# Patient Record
Sex: FEMALE | Race: WHITE | NOT HISPANIC OR LATINO | Employment: OTHER | RURAL
[De-identification: names, ages, dates, MRNs, and addresses within clinical notes are randomized per-mention and may not be internally consistent; named-entity substitution may affect disease eponyms.]

---

## 2019-07-05 ENCOUNTER — TELEPHONE (OUTPATIENT)
Dept: FAMILY MEDICINE CLINIC | Facility: CLINIC | Age: 79
End: 2019-07-05

## 2019-07-05 DIAGNOSIS — L71.9 ROSACEA: ICD-10-CM

## 2019-07-05 DIAGNOSIS — F41.9 ANXIETY: Primary | ICD-10-CM

## 2019-07-05 PROBLEM — I10 HYPERTENSION: Status: ACTIVE | Noted: 2019-07-05

## 2019-07-05 PROBLEM — M19.90 OSTEOARTHRITIS: Status: ACTIVE | Noted: 2019-07-05

## 2019-07-05 RX ORDER — ALPRAZOLAM 1 MG/1
0.5 TABLET ORAL DAILY PRN
Qty: 30 TABLET | Refills: 2 | Status: SHIPPED | OUTPATIENT
Start: 2019-07-05 | End: 2020-07-09 | Stop reason: SDUPTHER

## 2019-07-05 RX ORDER — LISINOPRIL 10 MG/1
TABLET ORAL
COMMUNITY
Start: 2013-09-05 | End: 2019-12-27

## 2019-07-05 RX ORDER — HYDROCHLOROTHIAZIDE 25 MG/1
TABLET ORAL
COMMUNITY
Start: 2012-05-15 | End: 2019-12-23

## 2019-07-05 RX ORDER — METRONIDAZOLE 7.5 MG/G
GEL TOPICAL DAILY
Qty: 45 G | Refills: 5 | Status: SHIPPED | OUTPATIENT
Start: 2019-07-05

## 2019-12-27 RX ORDER — HYDROCHLOROTHIAZIDE 25 MG/1
TABLET ORAL
Qty: 90 TABLET | Refills: 1 | Status: SHIPPED | OUTPATIENT
Start: 2019-12-27 | End: 2020-07-08

## 2019-12-27 RX ORDER — LISINOPRIL 5 MG/1
TABLET ORAL
Qty: 90 TABLET | Refills: 1 | Status: SHIPPED | OUTPATIENT
Start: 2019-12-27 | End: 2020-07-08

## 2020-07-06 ENCOUNTER — OFFICE VISIT (OUTPATIENT)
Dept: FAMILY MEDICINE CLINIC | Facility: CLINIC | Age: 80
End: 2020-07-06

## 2020-07-06 VITALS
SYSTOLIC BLOOD PRESSURE: 138 MMHG | BODY MASS INDEX: 27.83 KG/M2 | DIASTOLIC BLOOD PRESSURE: 70 MMHG | HEIGHT: 61 IN | WEIGHT: 147.4 LBS | TEMPERATURE: 97 F | HEART RATE: 90 BPM | OXYGEN SATURATION: 98 % | RESPIRATION RATE: 16 BRPM
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
## 2020-07-06 DIAGNOSIS — S80.861A INSECT BITE OF RIGHT LOWER LEG, INITIAL ENCOUNTER: Primary | ICD-10-CM

## 2020-07-06 PROBLEM — Z48.02 VISIT FOR SUTURE REMOVAL: Status: ACTIVE | Noted: 2020-07-06

## 2020-07-06 PROBLEM — H91.92 HEARING LOSS OF LEFT EAR: Status: ACTIVE | Noted: 2020-07-06

## 2020-07-06 PROBLEM — R94.31 PROLONGED QT INTERVAL: Status: ACTIVE | Noted: 2020-07-06

## 2020-07-06 PROBLEM — R73.9 HYPERGLYCEMIA: Status: ACTIVE | Noted: 2020-07-06

## 2020-07-06 PROBLEM — L71.9 ROSACEA: Status: ACTIVE | Noted: 2020-07-06

## 2020-07-06 PROBLEM — N18.30 CHRONIC KIDNEY DISEASE, STAGE 3 (HCC): Status: ACTIVE | Noted: 2020-07-06

## 2020-07-06 PROBLEM — M54.41 BILATERAL LOW BACK PAIN WITH RIGHT-SIDED SCIATICA: Status: ACTIVE | Noted: 2020-07-06

## 2020-07-06 PROBLEM — Z23 NEED FOR IMMUNIZATION AGAINST INFLUENZA: Status: ACTIVE | Noted: 2020-07-06

## 2020-07-06 PROBLEM — R23.1 LIVEDO RETICULARIS: Status: ACTIVE | Noted: 2020-07-06

## 2020-07-06 PROBLEM — M51.36 DEGENERATION OF INTERVERTEBRAL DISC OF LUMBAR REGION: Status: ACTIVE | Noted: 2020-07-06

## 2020-07-06 PROBLEM — S30.861A INSECT BITE OF ABDOMEN: Status: ACTIVE | Noted: 2020-07-06

## 2020-07-06 PROBLEM — I10 ESSENTIAL HYPERTENSION: Status: ACTIVE | Noted: 2020-07-06

## 2020-07-06 PROBLEM — Z13.31 POSITIVE DEPRESSION SCREENING: Status: ACTIVE | Noted: 2020-07-06

## 2020-07-06 PROBLEM — Z23 NEED FOR VACCINATION WITH 13-POLYVALENT PNEUMOCOCCAL CONJUGATE VACCINE: Status: ACTIVE | Noted: 2020-07-06

## 2020-07-06 PROBLEM — Z13.6 SCREENING FOR CARDIOVASCULAR CONDITION: Status: ACTIVE | Noted: 2020-07-06

## 2020-07-06 PROBLEM — R07.2 PRECORDIAL PAIN: Status: ACTIVE | Noted: 2020-07-06

## 2020-07-06 PROBLEM — Z00.01 ENCOUNTER FOR ROUTINE ADULT MEDICAL EXAM WITH ABNORMAL FINDINGS: Status: ACTIVE | Noted: 2020-07-06

## 2020-07-06 PROBLEM — Z86.19 HISTORY OF CHICKEN POX: Status: ACTIVE | Noted: 2020-07-06

## 2020-07-06 PROBLEM — N95.1 HOT FLASH, MENOPAUSAL: Status: ACTIVE | Noted: 2020-07-06

## 2020-07-06 PROBLEM — F41.9 ANXIETY: Status: ACTIVE | Noted: 2020-07-06

## 2020-07-06 PROBLEM — R21 RASH: Status: ACTIVE | Noted: 2020-07-06

## 2020-07-06 PROBLEM — L98.9 SKIN LESION OF BACK: Status: ACTIVE | Noted: 2020-07-06

## 2020-07-06 PROBLEM — M17.0 OSTEOARTHRITIS OF BOTH KNEES: Status: ACTIVE | Noted: 2020-07-06

## 2020-07-06 PROBLEM — R55 SYNCOPE: Status: ACTIVE | Noted: 2020-07-06

## 2020-07-06 PROBLEM — M79.606: Status: ACTIVE | Noted: 2020-07-06

## 2020-07-06 PROBLEM — R53.1 WEAKNESS: Status: ACTIVE | Noted: 2020-07-06

## 2020-07-06 PROCEDURE — 99213 OFFICE O/P EST LOW 20 MIN: CPT | Performed by: FAMILY MEDICINE

## 2020-07-06 RX ORDER — DOXYCYCLINE 100 MG/1
100 CAPSULE ORAL 2 TIMES DAILY
Qty: 20 CAPSULE | Refills: 0 | Status: SHIPPED | OUTPATIENT
Start: 2020-07-06 | End: 2020-09-10

## 2020-07-06 NOTE — PROGRESS NOTES
Subjective   Keya Rogers is a 79 y.o. female.     Chief Complaint   Patient presents with   • Rash   • Insect Bite       Rash   This is a new problem. The current episode started yesterday. The problem is unchanged. The affected locations include the right lower leg, left lower leg, right upper leg and left upper leg. The rash is characterized by redness. She was exposed to an insect bite/sting. Pertinent negatives include no cough, diarrhea, eye pain, facial edema, fever, rhinorrhea, shortness of breath, sore throat or vomiting. Past treatments include nothing. The treatment provided no relief.        The following portions of the patient's history were reviewed and updated as appropriate: allergies, current medications, past family history, past medical history, past social history, past surgical history and problem list.    Allergies:  Allergies   Allergen Reactions   • Dimenhydrinate Other (See Comments)   • Oxycodone-Acetaminophen Nausea And Vomiting       Social History:  Social History     Socioeconomic History   • Marital status:      Spouse name: Not on file   • Number of children: Not on file   • Years of education: Not on file   • Highest education level: Not on file   Tobacco Use   • Smoking status: Never Smoker   • Smokeless tobacco: Never Used   Substance and Sexual Activity   • Alcohol use: Never     Frequency: Never       Family History:  Family History   Problem Relation Age of Onset   • Hypertension Mother    • Cancer Mother    • Uterine cancer Mother    • COPD Father    • Heart disease Father        Past Medical History :  Patient Active Problem List   Diagnosis   • Hypertension   • Osteoarthritis   • Anxiety   • Bilateral low back pain with right-sided sciatica   • Chronic kidney disease, stage 3 (CMS/HCC)   • Degeneration of intervertebral disc of lumbar region   • Weakness   • Hearing loss of left ear   • History of chicken pox   • Hot flash, menopausal   • Hyperglycemia   • Insect  bite of abdomen   • Livedo reticularis   • Lower extremity pain, posterior   • Need for immunization against influenza   • Need for vaccination with 13-polyvalent pneumococcal conjugate vaccine   • Osteoarthritis of both knees   • Positive depression screening   • Precordial pain   • Prolonged QT interval   • Rash   • Rosacea   • Encounter for routine adult medical exam with abnormal findings   • Syncope   • Visit for suture removal   • Essential hypertension   • Skin lesion of back   • Screening for cardiovascular condition   • Insect bite of right lower leg       Medication List:    Current Outpatient Medications:   •  ALPRAZolam (XANAX) 1 MG tablet, Take 0.5 tablets by mouth Daily As Needed for Anxiety. Use sparingly., Disp: 30 tablet, Rfl: 2  •  hydroCHLOROthiazide (HYDRODIURIL) 25 MG tablet, TAKE 1 TABLET BY MOUTH EVERY DAY, Disp: 90 tablet, Rfl: 1  •  lisinopril (PRINIVIL,ZESTRIL) 5 MG tablet, TAKE 1 TABLET BY MOUTH EVERY DAY, Disp: 90 tablet, Rfl: 1  •  metroNIDAZOLE (METROGEL) 0.75 % gel, Apply  topically to the appropriate area as directed Daily., Disp: 45 g, Rfl: 5  •  Calcium Carbonate (CALCIUM 600 PO), Take  by mouth Daily., Disp: , Rfl:   •  doxycycline (MONODOX) 100 MG capsule, Take 1 capsule by mouth 2 (Two) Times a Day., Disp: 20 capsule, Rfl: 0    Past Surgical History:  Past Surgical History:   Procedure Laterality Date   • BUNIONECTOMY      RIght foot        Review of Systems:  Review of Systems   Constitutional: Negative for activity change and fever.   HENT: Negative for ear pain, rhinorrhea, sinus pressure, sore throat and voice change.    Eyes: Negative for pain and visual disturbance.   Respiratory: Negative for cough and shortness of breath.    Cardiovascular: Negative for chest pain.   Gastrointestinal: Negative for abdominal pain, diarrhea, nausea and vomiting.   Endocrine: Negative for cold intolerance and heat intolerance.   Genitourinary: Negative for frequency and urgency.  "  Musculoskeletal: Negative for arthralgias.   Skin: Positive for rash.   Neurological: Negative for syncope.   Hematological: Does not bruise/bleed easily.   Psychiatric/Behavioral: Negative for depressed mood. The patient is not nervous/anxious.        Physical Exam:  Vital Signs:  Visit Vitals  /70 (BP Location: Right arm)   Pulse 90   Temp 97 °F (36.1 °C)   Resp 16   Ht 154.9 cm (61\")   Wt 66.9 kg (147 lb 6.4 oz)   SpO2 98%   BMI 27.85 kg/m²       Physical Exam   Constitutional: She is oriented to person, place, and time. She appears well-developed and well-nourished. She is cooperative.   Cardiovascular: Normal rate, regular rhythm and normal heart sounds. Exam reveals no gallop and no friction rub.   No murmur heard.  Pulmonary/Chest: Effort normal and breath sounds normal. She has no wheezes. She has no rales.   Neurological: She is alert and oriented to person, place, and time. Coordination normal.   Skin: Skin is warm and dry.   Right post leg with bug bite, surrounding erythema, no streaking   Psychiatric: She has a normal mood and affect.   Vitals reviewed.      Assessment and Plan:  Problem List Items Addressed This Visit        Musculoskeletal and Integument    Insect bite of right lower leg - Primary    Overview     Looks infected. Will start doxycycline  Diagnosis, treatment and and course discussed. Potential side effects discussed. Return if there is worsening or persistence of symptoms.            Relevant Medications    doxycycline (MONODOX) 100 MG capsule          An After Visit Summary and PPPS were given to the patient.             I wore protective equipment throughout this patient encounter to include mask and gloves. Hand hygiene was performed before donning protective equipment and after removal when leaving the room.   "

## 2020-07-08 RX ORDER — LISINOPRIL 5 MG/1
TABLET ORAL
Qty: 90 TABLET | Refills: 0 | Status: SHIPPED | OUTPATIENT
Start: 2020-07-08 | End: 2020-07-09 | Stop reason: SDUPTHER

## 2020-07-08 RX ORDER — HYDROCHLOROTHIAZIDE 25 MG/1
TABLET ORAL
Qty: 90 TABLET | Refills: 0 | Status: SHIPPED | OUTPATIENT
Start: 2020-07-08 | End: 2020-07-09 | Stop reason: SDUPTHER

## 2020-07-09 ENCOUNTER — HOSPITAL ENCOUNTER (OUTPATIENT)
Dept: BONE DENSITY | Facility: HOSPITAL | Age: 80
Discharge: HOME | End: 2020-07-09
Admitting: FAMILY MEDICINE

## 2020-07-09 ENCOUNTER — OFFICE VISIT (OUTPATIENT)
Dept: FAMILY MEDICINE CLINIC | Facility: CLINIC | Age: 80
End: 2020-07-09

## 2020-07-09 VITALS
SYSTOLIC BLOOD PRESSURE: 118 MMHG | BODY MASS INDEX: 27.94 KG/M2 | RESPIRATION RATE: 16 BRPM | HEIGHT: 61 IN | DIASTOLIC BLOOD PRESSURE: 62 MMHG | WEIGHT: 148 LBS | HEART RATE: 86 BPM | TEMPERATURE: 98.1 F | OXYGEN SATURATION: 99 %

## 2020-07-09 DIAGNOSIS — Z78.0 MENOPAUSE: ICD-10-CM

## 2020-07-09 DIAGNOSIS — R23.3 PETECHIAE: Primary | ICD-10-CM

## 2020-07-09 DIAGNOSIS — F41.9 ANXIETY: ICD-10-CM

## 2020-07-09 DIAGNOSIS — M19.042 ARTHRITIS OF BOTH HANDS: ICD-10-CM

## 2020-07-09 DIAGNOSIS — I10 ESSENTIAL HYPERTENSION: ICD-10-CM

## 2020-07-09 DIAGNOSIS — M19.041 ARTHRITIS OF BOTH HANDS: ICD-10-CM

## 2020-07-09 DIAGNOSIS — M15.9 PRIMARY OSTEOARTHRITIS INVOLVING MULTIPLE JOINTS: ICD-10-CM

## 2020-07-09 DIAGNOSIS — R26.9 GAIT DISTURBANCE: ICD-10-CM

## 2020-07-09 PROBLEM — Z23 NEED FOR VACCINATION WITH 13-POLYVALENT PNEUMOCOCCAL CONJUGATE VACCINE: Status: RESOLVED | Noted: 2020-07-06 | Resolved: 2020-07-09

## 2020-07-09 PROBLEM — Z13.6 SCREENING FOR CARDIOVASCULAR CONDITION: Status: RESOLVED | Noted: 2020-07-06 | Resolved: 2020-07-09

## 2020-07-09 PROBLEM — Z23 NEED FOR IMMUNIZATION AGAINST INFLUENZA: Status: RESOLVED | Noted: 2020-07-06 | Resolved: 2020-07-09

## 2020-07-09 PROBLEM — R53.1 WEAKNESS: Status: RESOLVED | Noted: 2020-07-06 | Resolved: 2020-07-09

## 2020-07-09 PROBLEM — S80.861A INSECT BITE OF RIGHT LOWER LEG: Status: RESOLVED | Noted: 2020-07-06 | Resolved: 2020-07-09
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
## 2020-07-09 PROBLEM — Z48.02 VISIT FOR SUTURE REMOVAL: Status: RESOLVED | Noted: 2020-07-06 | Resolved: 2020-07-09

## 2020-07-09 PROBLEM — Z86.718 HISTORY OF DVT (DEEP VEIN THROMBOSIS): Status: ACTIVE | Noted: 2020-07-09

## 2020-07-09 PROBLEM — S30.861A INSECT BITE OF ABDOMEN: Status: RESOLVED | Noted: 2020-07-06 | Resolved: 2020-07-09

## 2020-07-09 PROBLEM — Z13.31 POSITIVE DEPRESSION SCREENING: Status: RESOLVED | Noted: 2020-07-06 | Resolved: 2020-07-09

## 2020-07-09 PROBLEM — Z00.01 ENCOUNTER FOR ROUTINE ADULT MEDICAL EXAM WITH ABNORMAL FINDINGS: Status: RESOLVED | Noted: 2020-07-06 | Resolved: 2020-07-09

## 2020-07-09 PROBLEM — R07.2 PRECORDIAL PAIN: Status: RESOLVED | Noted: 2020-07-06 | Resolved: 2020-07-09

## 2020-07-09 PROCEDURE — 77080 DXA BONE DENSITY AXIAL: CPT

## 2020-07-09 PROCEDURE — 99214 OFFICE O/P EST MOD 30 MIN: CPT | Performed by: FAMILY MEDICINE

## 2020-07-09 RX ORDER — ASPIRIN 325 MG
325 TABLET ORAL DAILY PRN
COMMUNITY
End: 2022-05-23

## 2020-07-09 RX ORDER — HYDROCHLOROTHIAZIDE 25 MG/1
25 TABLET ORAL DAILY
Qty: 90 TABLET | Refills: 2 | Status: SHIPPED | OUTPATIENT
Start: 2020-07-09

## 2020-07-09 RX ORDER — ALPRAZOLAM 1 MG/1
0.5 TABLET ORAL DAILY PRN
Qty: 30 TABLET | Refills: 2 | Status: SHIPPED | OUTPATIENT
Start: 2020-07-09 | End: 2021-01-15 | Stop reason: SDUPTHER

## 2020-07-09 RX ORDER — LISINOPRIL 5 MG/1
5 TABLET ORAL DAILY
Qty: 90 TABLET | Refills: 2 | Status: SHIPPED | OUTPATIENT
Start: 2020-07-09

## 2020-07-09 NOTE — PATIENT INSTRUCTIONS
Advance Care Planning and Advance Directives     You make decisions on a daily basis - decisions about where you want to live, your career, your home, your life. Perhaps one of the most important decisions you face is your choice for future medical care. Take time to talk with your family and your healthcare team and start planning today.  Advance Care Planning is a process that can help you:  · Understand possible future healthcare decisions in light of your own experiences  · Reflect on those decision in light of your goals and values  · Discuss your decisions with those closest to you and the healthcare professionals that care for you  · Make a plan by creating a document that reflects your wishes    Surrogate Decision Maker  In the event of a medical emergency, which has left you unable to communicate or to make your own decisions, you would need someone to make decisions for you.  It is important to discuss your preferences for medical treatment with this person while you are in good health.     Qualities of a surrogate decision maker:  • Willing to take on this role and responsibility  • Knows what you want for future medical care  • Willing to follow your wishes even if they don't agree with them  • Able to make difficult medical decisions under stressful circumstances    Advance Directives  These are legal documents you can create that will guide your healthcare team and decision maker(s) when needed. These documents can be stored in the electronic medical record.    · Living Will - a legal document to guide your care if you have a terminal condition or a serious illness and are unable to communicate. States vary by statute in document names/types, but most forms may include one or more of the following:        -  Directions regarding life-prolonging treatments        -  Directions regarding artificially provided nutrition/hydration        -  Choosing a healthcare decision maker        -  Direction  regarding organ/tissue donation    · Durable Power of  for Healthcare - this document names an -in-fact to make medical decisions for you, but it may also allow this person to make personal and financial decisions for you. Please seek the advice of an  if you need this type of document.    **Advance Directives are not required and no one may discriminate against you if you do not sign one.    Medical Orders  Many states allow specific forms/orders signed by your physician to record your wishes for medical treatment in your current state of health. This form, signed in personal communication with your physician, addresses resuscitation and other medical interventions that you may or may not want.      For more information or to schedule a time with a [facility] Advance Care Planning Facilitator contact: [email or web address] or call [phone] and someone will contact you directly.

## 2020-07-09 NOTE — PROGRESS NOTES
Chief Complaint   Patient presents with   • Insect Bite     tick bite follow up       Subjective   Keya Rogers is a 79 y.o. female.     Insect Bite   This is a new problem. The current episode started 1 to 4 weeks ago. The problem has been gradually improving. Associated symptoms include arthralgias and a rash. Pertinent negatives include no abdominal pain, chest pain, coughing, fever, joint swelling, myalgias, nausea, neck pain, sore throat or vomiting. Associated symptoms comments: Redness, swelling. Nothing aggravates the symptoms. Treatments tried: Doxycycline. The treatment provided moderate relief.   Osteoarthritis   Presents for initial visit. The disease course has been fluctuating. She complains of pain and stiffness. She reports no joint swelling or joint warmth. Affected location: knees, hips, hands. Associated symptoms include rash. Pertinent negatives include no diarrhea, dry mouth, dysuria or fever. Her past medical history is significant for osteoarthritis. Her family medical history includes family history of osteoarthritis. Past treatments include aspirin.      Rash   This is a new problem. The current episode started 2 weeks ago. The problem is gradually improving. The affected locations include the right lower leg, left lower leg, right upper leg and left upper leg. The rash is characterized by redness. She was exposed to an insect bite/sting.  Pertinent negatives include no cough, diarrhea, eye pain, facial edema, fever, rhinorrhea, shortness of breath, sore throat or vomiting. Past treatments include doxycycline. The treatment provided minor relief.     She also has a complain of left back/hip pain, bilateral hand pain.    I have reviewed and updated her medications, medical history and problem list during today's office visit.       Past Medical History :  Active Ambulatory Problems     Diagnosis Date Noted   • Hypertension 07/05/2019   • Osteoarthritis 07/05/2019   • Anxiety 07/06/2020    • Bilateral low back pain with right-sided sciatica 07/06/2020   • Chronic kidney disease, stage 3 (CMS/HCC) 07/06/2020   • Degeneration of intervertebral disc of lumbar region 07/06/2020   • Hearing loss of left ear 07/06/2020   • History of chicken pox 07/06/2020   • Hot flash, menopausal 07/06/2020   • Hyperglycemia 07/06/2020   • Livedo reticularis 07/06/2020   • Lower extremity pain, posterior 07/06/2020   • Osteoarthritis of both knees 07/06/2020   • Prolonged QT interval 07/06/2020   • Rash 07/06/2020   • Rosacea 07/06/2020   • Syncope 07/06/2020   • Essential hypertension 07/06/2020   • Skin lesion of back 07/06/2020   • History of DVT (deep vein thrombosis) 07/09/2020     Resolved Ambulatory Problems     Diagnosis Date Noted   • Weakness 07/06/2020   • Insect bite of abdomen 07/06/2020   • Need for immunization against influenza 07/06/2020   • Need for vaccination with 13-polyvalent pneumococcal conjugate vaccine 07/06/2020   • Positive depression screening 07/06/2020   • Precordial pain 07/06/2020   • Encounter for routine adult medical exam with abnormal findings 07/06/2020   • Visit for suture removal 07/06/2020   • Screening for cardiovascular condition 07/06/2020   • Insect bite of right lower leg 07/06/2020     No Additional Past Medical History       Medication List:    Current Outpatient Medications:   •  ALPRAZolam (XANAX) 1 MG tablet, Take 0.5 tablets by mouth Daily As Needed for Anxiety. Use sparingly., Disp: 30 tablet, Rfl: 2  •  aspirin 325 MG tablet, Take 325 mg by mouth Daily As Needed for Pain., Disp: , Rfl:   •  Calcium Carbonate (CALCIUM 600 PO), Take  by mouth Daily., Disp: , Rfl:   •  doxycycline (MONODOX) 100 MG capsule, Take 1 capsule by mouth 2 (Two) Times a Day., Disp: 20 capsule, Rfl: 0  •  hydroCHLOROthiazide (HYDRODIURIL) 25 MG tablet, TAKE 1 TABLET BY MOUTH EVERY DAY, Disp: 90 tablet, Rfl: 0  •  lisinopril (PRINIVIL,ZESTRIL) 5 MG tablet, TAKE 1 TABLET BY MOUTH EVERY DAY, Disp:  "90 tablet, Rfl: 0  •  metroNIDAZOLE (METROGEL) 0.75 % gel, Apply  topically to the appropriate area as directed Daily., Disp: 45 g, Rfl: 5      Allergies   Allergen Reactions   • Dimenhydrinate Other (See Comments)   • Oxycodone-Acetaminophen Nausea And Vomiting       Social History     Tobacco Use   • Smoking status: Never Smoker   • Smokeless tobacco: Never Used   Substance Use Topics   • Alcohol use: Never     Frequency: Never       Review of Systems   Constitutional: Negative for activity change and fever.   HENT: Negative for ear pain, rhinorrhea, sinus pressure, sore throat and voice change.    Eyes: Negative for pain and visual disturbance.   Respiratory: Negative for cough and shortness of breath.    Cardiovascular: Negative for chest pain, palpitations and leg swelling.   Gastrointestinal: Negative for abdominal pain, diarrhea, nausea and vomiting.   Endocrine: Negative for cold intolerance and heat intolerance.   Genitourinary: Negative for dysuria, frequency and urgency.   Musculoskeletal: Positive for arthralgias, back pain, gait problem and stiffness. Negative for joint swelling, myalgias, neck pain and neck stiffness.   Skin: Positive for rash.   Neurological: Negative for syncope.   Hematological: Bruises/bleeds easily.   Psychiatric/Behavioral: Negative for depressed mood. The patient is not nervous/anxious.        I have reviewed and confirmed the accuracy of the ROS as documented by the MA/LPN/RN Amie Escobar MD      Objective   Vitals:    07/09/20 1344   BP: 118/62   BP Location: Right arm   Patient Position: Sitting   Cuff Size: Large Adult   Pulse: 86   Resp: 16   Temp: 98.1 °F (36.7 °C)   TempSrc: Oral   SpO2: 99%   Weight: 67.1 kg (148 lb)   Height: 154.9 cm (61\")     Body mass index is 27.96 kg/m².    Physical Exam   Constitutional: She is oriented to person, place, and time. She appears well-developed and well-nourished. She is cooperative.   HENT:   Head: Normocephalic and " atraumatic.   Right Ear: External ear normal.   Left Ear: External ear normal.   Mouth/Throat: Oropharynx is clear and moist. No oropharyngeal exudate.   Eyes: Pupils are equal, round, and reactive to light. Conjunctivae and EOM are normal. Right eye exhibits no discharge. Left eye exhibits no discharge. No scleral icterus.   Neck: Normal range of motion. Neck supple. No thyromegaly present.   Cardiovascular: Normal rate, regular rhythm and normal heart sounds. Exam reveals no gallop and no friction rub.   No murmur heard.  Pulmonary/Chest: Effort normal and breath sounds normal. She has no wheezes. She has no rales.   Musculoskeletal: She exhibits deformity (OA changes bilateral hands, worse at MCP joints). She exhibits no edema or tenderness.   Lymphadenopathy:     She has no cervical adenopathy.   Neurological: She is alert and oriented to person, place, and time. Coordination normal.   Skin: Skin is warm and dry. Capillary refill takes less than 2 seconds. Rash (petechial type rash, bilateral LE) noted.   Right post leg with bug bite, surrounding erythema, no streaking   Psychiatric: She has a normal mood and affect. Her behavior is normal. Her speech is tangential.   Vitals reviewed.          Lab Results   Component Value Date     (H) 07/09/2020    BUN 21 07/09/2020    CREATININE 0.82 07/09/2020    EGFRIFNONA 67 07/09/2020    EGFRIFAFRI 78 07/09/2020     07/09/2020    K 4.1 07/09/2020    CL 95 (L) 07/09/2020    CALCIUM 10.1 07/09/2020    ALBUMIN 4.2 07/09/2020    BILITOT 0.3 07/09/2020    ALKPHOS 61 07/09/2020    AST 21 07/09/2020    ALT 12 07/09/2020    WBC 6.8 07/09/2020    RBC 4.35 07/09/2020    HCT 39.9 07/09/2020    MCV 92 07/09/2020    MCH 31.0 07/09/2020          Assessment/Plan     Diagnoses and all orders for this visit:    1. Petechiae (Primary)  Comments:  After recent insect bite.  Check CBC.  Orders:  -     CBC & Differential  -     Comprehensive Metabolic Panel    2. Essential  hypertension  Comments:  Stable.  Orders:  -     lisinopril (PRINIVIL,ZESTRIL) 5 MG tablet; Take 1 tablet by mouth Daily.  Dispense: 90 tablet; Refill: 2  -     hydroCHLOROthiazide (HYDRODIURIL) 25 MG tablet; Take 1 tablet by mouth Daily.  Dispense: 90 tablet; Refill: 2    3. Gait disturbance  Comments:  She declines PT.    4. Primary osteoarthritis involving multiple joints  Comments:  Check labs.    5. Arthritis of both hands  -     Rheumatoid Factor  -     Cyclic Citrul Peptide Antibody, IgG / IgA  -     Sedimentation Rate  -     LUCIE    6. Anxiety  Comments:  Refill chronic medication.  Patient denies side effects.  She declines wean of medication.  Orders:  -     ALPRAZolam (XANAX) 1 MG tablet; Take 0.5 tablets by mouth Daily As Needed for Anxiety. Use sparingly.  Dispense: 30 tablet; Refill: 2    7. Menopause  Comments:  She is agreeable to DEXA.  Orders:  -     DEXA Bone Density Axial        Return if symptoms worsen or fail to improve.     I wore protective equipment throughout this patient encounter to include mask. Hand hygiene was performed before donning protective equipment and after removal when leaving the room.

## 2020-07-11 LAB
ALBUMIN SERPL-MCNC: 4.2 G/DL (ref 3.6–4.6)
ALBUMIN/GLOB SERPL: 1.2 {RATIO} (ref 1.2–2.2)
ALP SERPL-CCNC: 61 IU/L (ref 39–117)
ALT SERPL-CCNC: 12 IU/L (ref 0–32)
ANA SER QL: NEGATIVE
AST SERPL-CCNC: 21 IU/L (ref 0–40)
BASOPHILS # BLD AUTO: 0 X10E3/UL (ref 0–0.2)
BASOPHILS NFR BLD AUTO: 1 %
BILIRUB SERPL-MCNC: 0.3 MG/DL (ref 0–1.2)
BUN SERPL-MCNC: 21 MG/DL (ref 8–27)
BUN/CREAT SERPL: 26 (ref 12–28)
CALCIUM SERPL-MCNC: 10.1 MG/DL (ref 8.7–10.3)
CCP IGA+IGG SERPL IA-ACNC: 3 UNITS (ref 0–19)
CHLORIDE SERPL-SCNC: 95 MMOL/L (ref 96–106)
CO2 SERPL-SCNC: 29 MMOL/L (ref 20–29)
CREAT SERPL-MCNC: 0.82 MG/DL (ref 0.57–1)
EOSINOPHIL # BLD AUTO: 0.2 X10E3/UL (ref 0–0.4)
EOSINOPHIL NFR BLD AUTO: 4 %
ERYTHROCYTE [DISTWIDTH] IN BLOOD BY AUTOMATED COUNT: 12.7 % (ref 11.7–15.4)
ERYTHROCYTE [SEDIMENTATION RATE] IN BLOOD BY WESTERGREN METHOD: 15 MM/HR (ref 0–40)
GLOBULIN SER CALC-MCNC: 3.4 G/DL (ref 1.5–4.5)
GLUCOSE SERPL-MCNC: 102 MG/DL (ref 65–99)
HCT VFR BLD AUTO: 39.9 % (ref 34–46.6)
HGB BLD-MCNC: 13.5 G/DL (ref 11.1–15.9)
IMM GRANULOCYTES # BLD AUTO: 0 X10E3/UL (ref 0–0.1)
IMM GRANULOCYTES NFR BLD AUTO: 0 %
LYMPHOCYTES # BLD AUTO: 2.7 X10E3/UL (ref 0.7–3.1)
LYMPHOCYTES NFR BLD AUTO: 40 %
MCH RBC QN AUTO: 31 PG (ref 26.6–33)
MCHC RBC AUTO-ENTMCNC: 33.8 G/DL (ref 31.5–35.7)
MCV RBC AUTO: 92 FL (ref 79–97)
MONOCYTES # BLD AUTO: 0.4 X10E3/UL (ref 0.1–0.9)
MONOCYTES NFR BLD AUTO: 5 %
NEUTROPHILS # BLD AUTO: 3.4 X10E3/UL (ref 1.4–7)
NEUTROPHILS NFR BLD AUTO: 50 %
PLATELET # BLD AUTO: 254 X10E3/UL (ref 150–450)
POTASSIUM SERPL-SCNC: 4.1 MMOL/L (ref 3.5–5.2)
PROT SERPL-MCNC: 7.6 G/DL (ref 6–8.5)
RBC # BLD AUTO: 4.35 X10E6/UL (ref 3.77–5.28)
RHEUMATOID FACT SERPL-ACNC: 25.3 IU/ML (ref 0–13.9)
SODIUM SERPL-SCNC: 140 MMOL/L (ref 134–144)
WBC # BLD AUTO: 6.8 X10E3/UL (ref 3.4–10.8)

## 2020-07-16 NOTE — PROGRESS NOTES
Please let patient know that her lab studies are back.  Most of her labs are normal.  This includes normal blood sugars, kidney and liver function.  I did 1 test to check for rheumatoid arthritis and it was abnormal.  However I did a total of 4 test and it was the only 1 that was abnormal.  I recommend that she follow-up in the office to discuss her lab results.  I did also like to get some x-rays when she comes in the talk about the results.

## 2020-07-17 ENCOUNTER — TELEPHONE (OUTPATIENT)
Dept: FAMILY MEDICINE CLINIC | Facility: CLINIC | Age: 80
End: 2020-07-17

## 2020-07-17 NOTE — TELEPHONE ENCOUNTER
Spoke with patient  appt made for patient to come back in and go over lab results with Dr. Escobar lab results given

## 2020-07-17 NOTE — TELEPHONE ENCOUNTER
Called Keya Rogers regarding results pt. verbalized understanding for results.    appt made for patient to return in a few days to speak with  Regarding labs

## 2020-07-17 NOTE — TELEPHONE ENCOUNTER
----- Message from Amie Escobar MD sent at 7/17/2020  5:39 PM EDT -----      ----- Message -----  From: Amie Escobar MD  Sent: 7/16/2020  12:58 PM EDT  To: Bree Ellis MA    Please let patient know that her lab studies are back.  Most of her labs are normal.  This includes normal blood sugars, kidney and liver function.  I did 1 test to check for rheumatoid arthritis and it was abnormal.  However I did a total of 4 test   and it was the only 1 that was abnormal.  I recommend that she follow-up in the office to discuss her lab results.  I did also like to get some x-rays when she comes in the talk about the results.

## 2020-07-23 ENCOUNTER — HOSPITAL ENCOUNTER (OUTPATIENT)
Dept: GENERAL RADIOLOGY | Facility: HOSPITAL | Age: 80
Discharge: HOME | End: 2020-07-23
Admitting: FAMILY MEDICINE

## 2020-07-23 ENCOUNTER — OFFICE VISIT (OUTPATIENT)
Dept: FAMILY MEDICINE CLINIC | Facility: CLINIC | Age: 80
End: 2020-07-23

## 2020-07-23 VITALS
BODY MASS INDEX: 27.9 KG/M2 | HEIGHT: 61 IN | SYSTOLIC BLOOD PRESSURE: 137 MMHG | TEMPERATURE: 97.4 F | WEIGHT: 147.8 LBS | DIASTOLIC BLOOD PRESSURE: 80 MMHG | OXYGEN SATURATION: 98 % | HEART RATE: 87 BPM

## 2020-07-23 DIAGNOSIS — M85.89 OSTEOPENIA OF MULTIPLE SITES: ICD-10-CM

## 2020-07-23 DIAGNOSIS — M19.041 ARTHRITIS OF BOTH HANDS: Primary | ICD-10-CM

## 2020-07-23 DIAGNOSIS — M19.042 ARTHRITIS OF BOTH HANDS: Primary | ICD-10-CM

## 2020-07-23 PROCEDURE — 73130 X-RAY EXAM OF HAND: CPT

## 2020-07-23 PROCEDURE — 99214 OFFICE O/P EST MOD 30 MIN: CPT | Performed by: FAMILY MEDICINE

## 2020-07-24 ENCOUNTER — TELEPHONE (OUTPATIENT)
Dept: FAMILY MEDICINE CLINIC | Facility: CLINIC | Age: 80
End: 2020-07-24

## 2020-07-24 NOTE — TELEPHONE ENCOUNTER
Called Keya Rogers regarding results pt. verbalized understanding for results.      Patient stated she does not want to do testing

## 2020-07-24 NOTE — TELEPHONE ENCOUNTER
----- Message from Amie Escobar MD sent at 7/23/2020  1:29 PM EDT -----  Please let patient know that her x-ray is showing regular osteoarthritis and no joint destruction consistent with rheumatoid arthritis.  We talked about additional testing if this was negative for RA.  She can let me know if she'd like to proceed.

## 2020-08-05 PROBLEM — M19.042 ARTHRITIS OF BOTH HANDS: Status: ACTIVE | Noted: 2020-08-05

## 2020-08-05 PROBLEM — M19.041 ARTHRITIS OF BOTH HANDS: Status: ACTIVE | Noted: 2020-08-05

## 2020-08-05 PROBLEM — M85.89 OSTEOPENIA OF MULTIPLE SITES: Status: ACTIVE | Noted: 2020-08-05

## 2020-08-06 NOTE — ASSESSMENT & PLAN NOTE
Proceed with hand x-ray to look for destructive lesions.  Labs do not confirm RA today.  If x-ray negative for findings that could suggest RA, I recommended repeat of labs in 6 mo or u/s of the joints.  She will consider and let me know how to proceed when we call her with the x-ray results.

## 2020-09-10 ENCOUNTER — OFFICE VISIT (OUTPATIENT)
Dept: FAMILY MEDICINE CLINIC | Facility: CLINIC | Age: 80
End: 2020-09-10

## 2020-09-10 ENCOUNTER — HOSPITAL ENCOUNTER (OUTPATIENT)
Dept: GENERAL RADIOLOGY | Facility: HOSPITAL | Age: 80
Discharge: HOME | End: 2020-09-10
Admitting: FAMILY MEDICINE

## 2020-09-10 VITALS
DIASTOLIC BLOOD PRESSURE: 68 MMHG | WEIGHT: 148.2 LBS | OXYGEN SATURATION: 99 % | HEIGHT: 63 IN | TEMPERATURE: 98.4 F | BODY MASS INDEX: 26.26 KG/M2 | HEART RATE: 77 BPM | SYSTOLIC BLOOD PRESSURE: 115 MMHG

## 2020-09-10 DIAGNOSIS — M54.12 CERVICAL RADICULOPATHY: Primary | ICD-10-CM

## 2020-09-10 PROCEDURE — 99213 OFFICE O/P EST LOW 20 MIN: CPT | Performed by: FAMILY MEDICINE

## 2020-09-10 PROCEDURE — 72050 X-RAY EXAM NECK SPINE 4/5VWS: CPT

## 2020-09-10 RX ORDER — TRAMADOL HYDROCHLORIDE 50 MG/1
50 TABLET ORAL EVERY 8 HOURS PRN
Qty: 12 TABLET | Refills: 0 | Status: SHIPPED | OUTPATIENT
Start: 2020-09-10 | End: 2022-05-25

## 2020-09-10 RX ORDER — METHYLPREDNISOLONE 4 MG/1
TABLET ORAL
Qty: 21 TABLET | Refills: 0 | Status: SHIPPED | OUTPATIENT
Start: 2020-09-10 | End: 2020-09-16

## 2020-09-10 NOTE — PROGRESS NOTES
Chief Complaint   Patient presents with   • Neck Pain       Subjective   Keya Rogers is a 79 y.o. female.     Neck Pain    This is a new problem. The current episode started 1 to 4 weeks ago. The problem occurs daily. The problem has been unchanged. The pain is associated with nothing. The pain is present in the occipital region (patient states that the pain goes down her neck, into her shoulder, and down her entire arm to her hand (whole hand affected)). The quality of the pain is described as stabbing and shooting. The pain is at a severity of 8/10. The pain is moderate. The pain is same all the time. Stiffness is present at night. Pertinent negatives include no fever or weakness. She has tried acetaminophen (patient has been taking asprin every 4 hours for a week ) for the symptoms. The treatment provided no relief.          I have reviewed and updated her medications, medical history and problem list during today's office visit.       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   • Lower extremity pain, posterior 07/06/2020   • Osteoarthritis of both knees 07/06/2020   • Prolonged QT interval 07/06/2020   • Rash 07/06/2020   • Rosacea 07/06/2020   • Syncope 07/06/2020   • Essential hypertension 07/06/2020   • Skin lesion of back 07/06/2020   • History of DVT (deep vein thrombosis) 07/09/2020   • Arthritis of both hands 08/05/2020   • Osteopenia of multiple sites 08/05/2020     Resolved Ambulatory Problems     Diagnosis Date Noted   •  Weakness 07/06/2020   • Insect bite of abdomen 07/06/2020   • Need for immunization against influenza 07/06/2020   • Need for vaccination with 13-polyvalent pneumococcal conjugate vaccine 07/06/2020   • Positive depression screening 07/06/2020   • Precordial pain 07/06/2020   • Encounter for routine adult medical exam with abnormal findings 07/06/2020   • Visit for suture removal 07/06/2020   • Screening for cardiovascular condition 07/06/2020   • Insect bite of right lower leg 07/06/2020     No Additional Past Medical History       Medication List:    Current Outpatient Medications:   •  ALPRAZolam (XANAX) 1 MG tablet, Take 0.5 tablets by mouth Daily As Needed for Anxiety. Use sparingly., Disp: 30 tablet, Rfl: 2  •  aspirin 325 MG tablet, Take 325 mg by mouth Daily As Needed for Pain., Disp: , Rfl:   •  Calcium Carbonate (CALCIUM 600 PO), Take  by mouth Daily., Disp: , Rfl:   •  hydroCHLOROthiazide (HYDRODIURIL) 25 MG tablet, Take 1 tablet by mouth Daily., Disp: 90 tablet, Rfl: 2  •  lisinopril (PRINIVIL,ZESTRIL) 5 MG tablet, Take 1 tablet by mouth Daily., Disp: 90 tablet, Rfl: 2  •  metroNIDAZOLE (METROGEL) 0.75 % gel, Apply  topically to the appropriate area as directed Daily., Disp: 45 g, Rfl: 5    Allergies   Allergen Reactions   • Dimenhydrinate Other (See Comments)   • Oxycodone-Acetaminophen Nausea And Vomiting       Social History     Tobacco Use   • Smoking status: Never Smoker   • Smokeless tobacco: Never Used   Substance Use Topics   • Alcohol use: Never     Frequency: Never       Review of Systems   Constitutional: Negative for chills and fever.   Gastrointestinal:        No change in bowel or bladder function   Musculoskeletal: Positive for neck pain. Negative for gait problem and neck stiffness.   Neurological: Negative for tremors, facial asymmetry, weakness and headache.       I have reviewed and confirmed the accuracy of the ROS as documented by the MA/LPN/RN Amie Escobar,  "MD      Objective   Vitals:    09/10/20 1447   BP: 115/68   Pulse: 77   Temp: 98.4 °F (36.9 °C)   TempSrc: Temporal   SpO2: 99%   Weight: 67.2 kg (148 lb 3.2 oz)   Height: 160 cm (63\")     Body mass index is 26.25 kg/m².    Physical Exam   Constitutional: She appears well-developed and well-nourished. No distress.   Eyes: Conjunctivae are normal.   Neck: Spinous process tenderness present. No neck rigidity. Decreased range of motion present. No edema and no erythema present.   Cardiovascular: Normal rate, regular rhythm and normal heart sounds.   No murmur heard.  Pulmonary/Chest: Effort normal and breath sounds normal.   Musculoskeletal: No edema.      Cervical back: She exhibits decreased range of motion, tenderness and pain. She exhibits no edema.        Back:    Neurological: She is alert. She has normal strength. She displays no atrophy and no tremor. No cranial nerve deficit. She exhibits normal muscle tone. Coordination normal.   Reflex Scores:       Bicep reflexes are 2+ on the right side and 2+ on the left side.       Patellar reflexes are 2+ on the right side and 2+ on the left side.  Her whole body jumps with DTR testing   Skin: Skin is warm. Capillary refill takes less than 2 seconds. No rash noted. No erythema.       Xr Spine Cervical Complete 4 Or 5 View    Result Date: 9/10/2020  Impression:  1. No acute fracture or dislocation. 2. Degenerative changes.  Electronically Signed By-Jordan Siddiqui On:9/10/2020 4:56 PM This report was finalized on [id number] by  Jordan Sdidiqui, .          Lab Results   Component Value Date     (H) 07/09/2020    BUN 21 07/09/2020    CREATININE 0.82 07/09/2020    EGFRIFNONA 67 07/09/2020    EGFRIFAFRI 78 07/09/2020     07/09/2020    K 4.1 07/09/2020    CL 95 (L) 07/09/2020    CALCIUM 10.1 07/09/2020    ALBUMIN 4.2 07/09/2020    BILITOT 0.3 07/09/2020    ALKPHOS 61 07/09/2020    AST 21 07/09/2020    ALT 12 07/09/2020    WBC 6.8 07/09/2020    RBC 4.35 07/09/2020    " HCT 39.9 07/09/2020    MCV 92 07/09/2020    MCH 31.0 07/09/2020          Assessment/Plan     Diagnoses and all orders for this visit:    1. Cervical radiculopathy (Primary)  -     methylPREDNISolone (MEDROL) 4 MG dose pack; Take as directed on package instructions.  Dispense: 21 tablet; Refill: 0  -     XR Spine Cervical Complete 4 or 5 View  -     traMADol (ULTRAM) 50 MG tablet; Take 1 tablet by mouth Every 8 (Eight) Hours As Needed for Severe Pain .  Dispense: 12 tablet; Refill: 0        Return in about 6 days (around 9/16/2020) for Recheck.     I wore protective equipment throughout this patient encounter to include mask and eye protection. Hand hygiene was performed before donning protective equipment and after removal when leaving the room.  Patient also wore a mask.

## 2020-09-11 ENCOUNTER — TELEPHONE (OUTPATIENT)
Dept: FAMILY MEDICINE CLINIC | Facility: CLINIC | Age: 80
End: 2020-09-11

## 2020-09-11 NOTE — TELEPHONE ENCOUNTER
----- Message from Amie Escobar MD sent at 9/11/2020  1:17 PM EDT -----  Please let patient know that she does have arthritis in the lower part of her neck that could be causing a pinched nerve that would travel down her arm.  Take medications as prescribed and we'll address in more detail at her f/u visit on 9/16.

## 2020-09-13 ENCOUNTER — TELEPHONE (OUTPATIENT)
Dept: FAMILY MEDICINE CLINIC | Facility: CLINIC | Age: 80
End: 2020-09-13

## 2020-09-13 NOTE — TELEPHONE ENCOUNTER
After-hours phone discussion with Keya, she is developing some anxiety/palpitations / not toleratering prednisone, she has had eval in her house per EMTs, she is instructed to discontinue prednisone, okay to start taking as needed Tylenol for her cervical disc disease, she will keep her follow-up visit next week with Dr. Escobar, she will call or return if worsening symptoms

## 2020-09-16 ENCOUNTER — OFFICE VISIT (OUTPATIENT)
Dept: FAMILY MEDICINE CLINIC | Facility: CLINIC | Age: 80
End: 2020-09-16

## 2020-09-16 VITALS
WEIGHT: 147 LBS | RESPIRATION RATE: 18 BRPM | SYSTOLIC BLOOD PRESSURE: 124 MMHG | HEART RATE: 80 BPM | DIASTOLIC BLOOD PRESSURE: 70 MMHG | OXYGEN SATURATION: 98 % | HEIGHT: 63 IN | BODY MASS INDEX: 26.05 KG/M2 | TEMPERATURE: 98.2 F

## 2020-09-16 DIAGNOSIS — M51.36 DEGENERATION OF INTERVERTEBRAL DISC OF LUMBAR REGION: Primary | ICD-10-CM

## 2020-09-16 PROCEDURE — 99213 OFFICE O/P EST LOW 20 MIN: CPT | Performed by: FAMILY MEDICINE

## 2020-09-16 NOTE — PROGRESS NOTES
Chief Complaint   Patient presents with   • Neck Pain     follow up   • Arm Pain     right, follow up       Subjective   Keya Rogers is a 79 y.o. female.     Cervical radiculopathy:  Minimal improvement.   Pt prescribed Medrol Pack 09/10/2020, discontinued due to side effects.    Arm Pain   Incident onset: 2 WEEKS. There was no injury mechanism. The pain is present in the right forearm, right hand, right shoulder, right fingers and right elbow. The quality of the pain is described as shooting. The pain is at a severity of 6/10. The pain is moderate (TO SEVERE). The pain has been fluctuating since the incident. Associated symptoms include numbness (right hand and fingers) and tingling (right hand and fingeres). Nothing aggravates the symptoms. She has tried rest (steroids) for the symptoms. The treatment provided mild (Slight improvement, discontinued due to side effects) relief.        I have reviewed and updated her medications, medical history and problem list during today's office visit.       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   • Lower extremity pain, posterior 07/06/2020   • Osteoarthritis of both knees 07/06/2020   • Prolonged QT interval 07/06/2020   • Rash 07/06/2020   • Rosacea 07/06/2020   • Syncope 07/06/2020   • Essential hypertension 07/06/2020   • Skin lesion of back 07/06/2020   • History of DVT (deep vein thrombosis) 07/09/2020   • Arthritis of both hands 08/05/2020   • Osteopenia of multiple sites 08/05/2020      Resolved Ambulatory Problems     Diagnosis Date Noted   • Weakness 07/06/2020   • Insect bite of abdomen 07/06/2020   • Need for immunization against influenza 07/06/2020   • Need for vaccination with 13-polyvalent pneumococcal conjugate vaccine 07/06/2020   • Positive depression screening 07/06/2020   • Precordial pain 07/06/2020   • Encounter for routine adult medical exam with abnormal findings 07/06/2020   • Visit for suture removal 07/06/2020   • Screening for cardiovascular condition 07/06/2020   • Insect bite of right lower leg 07/06/2020     Past Medical History:   Diagnosis Date   • Deep vein thrombosis (CMS/HCC)        Medication List:    Current Outpatient Medications:   •  ALPRAZolam (XANAX) 1 MG tablet, Take 0.5 tablets by mouth Daily As Needed for Anxiety. Use sparingly., Disp: 30 tablet, Rfl: 2  •  aspirin 325 MG tablet, Take 325 mg by mouth Daily As Needed for Pain., Disp: , Rfl:   •  Calcium Carbonate (CALCIUM 600 PO), Take  by mouth Daily., Disp: , Rfl:   •  hydroCHLOROthiazide (HYDRODIURIL) 25 MG tablet, Take 1 tablet by mouth Daily., Disp: 90 tablet, Rfl: 2  •  lisinopril (PRINIVIL,ZESTRIL) 5 MG tablet, Take 1 tablet by mouth Daily., Disp: 90 tablet, Rfl: 2  •  metroNIDAZOLE (METROGEL) 0.75 % gel, Apply  topically to the appropriate area as directed Daily., Disp: 45 g, Rfl: 5  •  traMADol (ULTRAM) 50 MG tablet, Take 1 tablet by mouth Every 8 (Eight) Hours As Needed for Severe Pain ., Disp: 12 tablet, Rfl: 0      Allergies   Allergen Reactions   • Dimenhydrinate Other (See Comments)   • Oxycodone-Acetaminophen Nausea And Vomiting       Social History     Tobacco Use   • Smoking status: Never Smoker   • Smokeless tobacco: Never Used   Substance Use Topics   • Alcohol use: Never     Frequency: Never         Review of Systems   Constitutional: Negative for chills and fever.   Gastrointestinal:        No change in bowel or bladder function   Musculoskeletal: Positive for myalgias (better) and neck  "pain (better). Negative for gait problem and neck stiffness.   Neurological: Positive for tingling (right hand and fingeres) and numbness (right hand and fingers). Negative for tremors, facial asymmetry, weakness and headache.   Psychiatric/Behavioral: Negative for sleep disturbance.         Objective   Vitals:    09/16/20 1359   BP: 124/70   BP Location: Right arm   Patient Position: Sitting   Cuff Size: Large Adult   Pulse: 80   Resp: 18   Temp: 98.2 °F (36.8 °C)   TempSrc: Skin   SpO2: 98%   Weight: 66.7 kg (147 lb)   Height: 160 cm (63\")     Body mass index is 26.04 kg/m².    Physical Exam  Constitutional:       General: She is not in acute distress.     Appearance: She is normal weight. She is not ill-appearing.   HENT:      Head: Normocephalic and atraumatic.      Nose: Nose normal.      Mouth/Throat:      Mouth: Mucous membranes are moist.   Eyes:      General: No scleral icterus.     Conjunctiva/sclera: Conjunctivae normal.   Neck:      Musculoskeletal: Normal range of motion and neck supple. Muscular tenderness present. No neck rigidity.     Cardiovascular:      Rate and Rhythm: Normal rate and regular rhythm.      Heart sounds: Normal heart sounds.   Pulmonary:      Effort: Pulmonary effort is normal.      Breath sounds: Normal breath sounds.   Musculoskeletal:      Right lower leg: No edema.      Left lower leg: No edema.   Neurological:      General: No focal deficit present.      Mental Status: She is alert and oriented to person, place, and time. Mental status is at baseline.      Cranial Nerves: No cranial nerve deficit.      Motor: No weakness.      Coordination: Coordination normal.      Gait: Gait normal.      Deep Tendon Reflexes: Reflexes normal.   Psychiatric:         Mood and Affect: Mood normal.         Behavior: Behavior normal.         Thought Content: Thought content normal.         Xr Spine Cervical Complete 4 Or 5 View    Result Date: 9/10/2020  Impression:  1. No acute fracture or " dislocation. 2. Degenerative changes.  Electronically Signed By-Jordan Siddiqui On:9/10/2020 4:56 PM This report was finalized on [id number] by  Jordan Siddiqui, .      Lab Results   Component Value Date     (H) 07/09/2020    BUN 21 07/09/2020    CREATININE 0.82 07/09/2020    EGFRIFNONA 67 07/09/2020    EGFRIFAFRI 78 07/09/2020     07/09/2020    K 4.1 07/09/2020    CL 95 (L) 07/09/2020    CALCIUM 10.1 07/09/2020    ALBUMIN 4.2 07/09/2020    BILITOT 0.3 07/09/2020    ALKPHOS 61 07/09/2020    AST 21 07/09/2020    ALT 12 07/09/2020    WBC 6.8 07/09/2020    RBC 4.35 07/09/2020    HCT 39.9 07/09/2020    MCV 92 07/09/2020    MCH 31.0 07/09/2020          Assessment/Plan     Diagnoses and all orders for this visit:    1. Degeneration of intervertebral disc of lumbar region (Primary)  -     Ambulatory Referral to Physical Therapy Evaluate and treat    I will have her continue her steroids for 6 more days at the dosage of medrol 4 mg once daily.  She can stop if side effects recur.  Refer to PT.    Return if symptoms worsen or fail to improve.   Recommended follow-up for full physical examination or Medical Wellness visit and routine health maintanence.  Patient will consider and call to schedule when interested in proceeding with recommendation.       I wore protective equipment throughout this patient encounter to include mask and eye protection. Hand hygiene was performed before donning protective equipment and after removal when leaving the room.  Patient also wore a mask.

## 2020-09-16 NOTE — PATIENT INSTRUCTIONS
Advance Care Planning and Advance Directives     You make decisions on a daily basis - decisions about where you want to live, your career, your home, your life. Perhaps one of the most important decisions you face is your choice for future medical care. Take time to talk with your family and your healthcare team and start planning today.  Advance Care Planning is a process that can help you:  · Understand possible future healthcare decisions in light of your own experiences  · Reflect on those decision in light of your goals and values  · Discuss your decisions with those closest to you and the healthcare professionals that care for you  · Make a plan by creating a document that reflects your wishes    Surrogate Decision Maker  In the event of a medical emergency, which has left you unable to communicate or to make your own decisions, you would need someone to make decisions for you.  It is important to discuss your preferences for medical treatment with this person while you are in good health.     Qualities of a surrogate decision maker:  • Willing to take on this role and responsibility  • Knows what you want for future medical care  • Willing to follow your wishes even if they don't agree with them  • Able to make difficult medical decisions under stressful circumstances    Advance Directives  These are legal documents you can create that will guide your healthcare team and decision maker(s) when needed. These documents can be stored in the electronic medical record.    · Living Will - a legal document to guide your care if you have a terminal condition or a serious illness and are unable to communicate. States vary by statute in document names/types, but most forms may include one or more of the following:        -  Directions regarding life-prolonging treatments        -  Directions regarding artificially provided nutrition/hydration        -  Choosing a healthcare decision maker        -  Direction  regarding organ/tissue donation    · Durable Power of  for Healthcare - this document names an -in-fact to make medical decisions for you, but it may also allow this person to make personal and financial decisions for you. Please seek the advice of an  if you need this type of document.    **Advance Directives are not required and no one may discriminate against you if you do not sign one.    Medical Orders  Many states allow specific forms/orders signed by your physician to record your wishes for medical treatment in your current state of health. This form, signed in personal communication with your physician, addresses resuscitation and other medical interventions that you may or may not want.      For more information or to schedule a time with a [facility] Advance Care Planning Facilitator contact: [email or web address] or call [phone] and someone will contact you directly.

## 2020-09-29 ENCOUNTER — TREATMENT (OUTPATIENT)
Dept: PHYSICAL THERAPY | Facility: CLINIC | Age: 80
End: 2020-09-29

## 2020-09-29 DIAGNOSIS — M54.2 PAIN, NECK: ICD-10-CM

## 2020-09-29 DIAGNOSIS — M51.36 DEGENERATION OF LUMBAR INTERVERTEBRAL DISC: Primary | ICD-10-CM

## 2020-09-29 DIAGNOSIS — G89.29 CHRONIC BILATERAL LOW BACK PAIN, UNSPECIFIED WHETHER SCIATICA PRESENT: ICD-10-CM

## 2020-09-29 DIAGNOSIS — M54.50 CHRONIC BILATERAL LOW BACK PAIN, UNSPECIFIED WHETHER SCIATICA PRESENT: ICD-10-CM

## 2020-09-29 PROCEDURE — 97162 PT EVAL MOD COMPLEX 30 MIN: CPT | Performed by: PHYSICAL THERAPIST

## 2020-09-29 PROCEDURE — 97110 THERAPEUTIC EXERCISES: CPT | Performed by: PHYSICAL THERAPIST

## 2020-09-29 NOTE — PROGRESS NOTES
Physical Therapy Initial Evaluation and Plan of Care    Patient: Keya Rogers   : 1940  Diagnosis/ICD-10 Code:  Degeneration of lumbar intervertebral disc [M51.36]  Referring practitioner: Amie Sharma*  Date of Initial Visit: 2020  Today's Date: 2020  Patient seen for 1 sessions           Subjective Questionnaire: Oswestry: 42%      Subjective Evaluation    History of Present Illness  Mechanism of injury: Patient reports gradual onset of low back pain ~6 months ago with insidious onset.  Patient reports having B low back, glute, and posterior LE pain that caused difficulty standing from a chair, with stairs, donning socks/shoes, ambulating for >10 minutes, standing for 15 minutes, and bending forward to the floor.  It improves with exercise.  Also complains of RUE pain with NT into R hand with gradual onset.  Patient states it is difficult to lift, especially from the floor.  RUE pain worsens: lying on R side, using RUE for ADLs.  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          Patient Occupation: Retired  Pain  Current pain ratin  At best pain ratin  At worst pain ratin  Location: low back, R posterior upper arm  Quality: dull ache  Aggravating factors: ambulation, sleeping, standing, stairs, lifting and movement    Social Support  Lives in: one-story house (2-3 stairs to laundry room and garage)  Lives with: spouse    Hand dominance: right    Patient Goals  Patient goals for therapy: decreased pain, increased motion, improved balance, increased strength and independence with ADLs/IADLs             Objective          Static Posture     Comments  B shoulders rounded, B scapulae protracted, forward head, decreased lumbar lordosis, PSIS and iliac crests level in standing    Palpation   Left   No palpable tenderness to the upper trapezius.   Tenderness of the erector spinae, cervical paraspinals and lumbar paraspinals.     Right  Tenderness of the erector spinae, cervical paraspinals, lumbar paraspinals, suboccipitals and upper trapezius.     Additional Palpation Details  R tricep    Tenderness     Lumbar Spine  Tenderness in the spinous process.     Left Hip   Tenderness in the ASIS.     Right Hip   Tenderness in the PSIS.     Additional Tenderness Details  sacrum    Active Range of Motion   Cervical/Thoracic Spine   Cervical    Flexion: WFL  Extension: WFL    Lumbar   Left lateral flexion: WFL  Right lateral flexion: WFL    Additional Active Range of Motion Details  B lateral cervical flexion ~50% limited   L cervical rotation ~50% limited  R cervical rotation ~25% limited   Cervical AROM non-provocative  Lumbar flexion - hands to shins (pull in B hamstrings)  Lumbar extension - ~50% limited and painful      Strength/Myotome Testing     Left Shoulder     Planes of Motion   Flexion: 4   Abduction: 4   External rotation at 0°: 4   Internal rotation at 0°: 4+     Right Shoulder     Planes of Motion   Flexion: 4-   Abduction: 4-   External rotation at 0°: 4-   Internal rotation at 0°: 4+     Left Hip   Planes of Motion   Flexion: 4-  Abduction: 4-    Right Hip   Planes of Motion   Flexion: 4  Abduction: 4-    Left Knee   Flexion: 4  Extension: 4+    Right Knee   Flexion: 4  Extension: 4+    Left Ankle/Foot   Dorsiflexion: 4    Right Ankle/Foot   Dorsiflexion: 4+    Tests   Cervical     Left   Negative active compression ([place]) and Spurling's sign.     Right   Negative active compression ([place]) and Spurling's sign.     Thoracic   Negative slump.     Lumbar     Left   Negative crossed SLR and passive SLR.     Right   Negative crossed SLR and passive SLR.     Left Pelvic Girdle/Sacrum   Positive: gapping and thigh thrust.     Right Pelvic Girdle/Sacrum   Positive: gapping and thigh thrust.     Additional Tests Details  Quadrant increased B low back pain but no increased radicular symptoms     Ambulation     Comments   Decreased elijah,  decreased step length B, mild forward trunk lean          Assessment & Plan     Assessment  Impairments: abnormal or restricted ROM, activity intolerance, impaired balance, impaired physical strength, lacks appropriate home exercise program and pain with function  Assessment details: Patient is an 79 year old female with complaints of low back pain, neck and RUE pain with R hand NT.  Patient presents with decreased cervical and trunk mobility, weakness of BUEs and BLEs, impaired balance, difficulty performing ADLs including donning socks/shoes, carrying groceries, limited walking and standing tolerance, and poor sleep pattern.  Patient presents with the impairments listed above and based on the objective findings and the physical therapy evaluation, the patient's condition has the potential to improve in response to therapy.   The patient's condition and/or services required are at a level of complexity that necessitates the skill & supervision of a physical therapist.    Prognosis: good  Functional Limitations: carrying objects, lifting, sleeping, walking, moving in bed, standing, stooping and reaching behind back  Goals  Plan Goals: STG:  -Patient will report a reduction in low back by >/=25% for improved ability to perform transitions in 2-3 weeks.   -Patient will report no NT in R hand to indicate symptoms are centralizing in 2 weeks.  -Patient report no pain when performing bed mobility and transitions in 3 weeks.    LTG:  -Patient will demonstrate improved trunk mobility to WFL without complaints of low back pain by discharge.  -Patient will report min to no back or RUE pain for improved ability to perform ADLs by discharge.  -Patient will be able to perform stairs reciprocally by discharge.  -Patient will be independent with HEP by discharge.     Plan  Therapy options: will be seen for skilled physical therapy services  Planned modality interventions: dry needling, electrical stimulation/Russian stimulation,  thermotherapy (hydrocollator packs), traction, ultrasound and TENS  Planned therapy interventions: manual therapy, neuromuscular re-education, soft tissue mobilization, spinal/joint mobilization, strengthening, stretching, therapeutic activities, joint mobilization, home exercise program, gait training, functional ROM exercises, flexibility, balance/weight-bearing training and ADL retraining  Frequency: 1-2x/week.  Duration in visits: 20  Treatment plan discussed with: patient        Timed:         Manual Therapy:         mins  00961;     Therapeutic Exercise:    15     mins  18879;     Neuromuscular Estelle:        mins  97729;    Therapeutic Activity:          mins  10208;     Gait Training:           mins  67708;     Ultrasound:          mins  37599;    Ionto                                   mins   97346  Self-care  ____ mins 70777    Un-Timed:  Electrical Stimulation:         mins  26016 ( );  Dry Needling          mins self-pay  Traction          mins 81625  Low Eval          Mins  64937  Mod Eval     32     Mins  61563  High Eval                            Mins  76124        Timed Treatment:   15   mins   Total Treatment:     47   mins    PT SIGNATURE: Lolis Celestin PT   [place] License # [id number]  Physical Therapist  DATE TREATMENT INITIATED: 9/29/2020    Initial Certification  Certification Period: 12/28/2020  I certify that the therapy services are furnished while this patient is under my care.  The services outlined above are required by this patient, and will be reviewed every 90 days.     PHYSICIAN: Amie Escobar MD      DATE:     Please sign and return via fax to [phone].. Thank you, [facility].

## 2020-10-02 ENCOUNTER — TREATMENT (OUTPATIENT)
Dept: PHYSICAL THERAPY | Facility: CLINIC | Age: 80
End: 2020-10-02

## 2020-10-02 DIAGNOSIS — M54.2 PAIN, NECK: ICD-10-CM

## 2020-10-02 DIAGNOSIS — M54.50 CHRONIC BILATERAL LOW BACK PAIN, UNSPECIFIED WHETHER SCIATICA PRESENT: ICD-10-CM

## 2020-10-02 DIAGNOSIS — G89.29 CHRONIC BILATERAL LOW BACK PAIN, UNSPECIFIED WHETHER SCIATICA PRESENT: ICD-10-CM

## 2020-10-02 DIAGNOSIS — M51.36 DEGENERATION OF LUMBAR INTERVERTEBRAL DISC: Primary | ICD-10-CM

## 2020-10-02 PROCEDURE — 97140 MANUAL THERAPY 1/> REGIONS: CPT | Performed by: PHYSICAL THERAPIST

## 2020-10-02 PROCEDURE — 97110 THERAPEUTIC EXERCISES: CPT | Performed by: PHYSICAL THERAPIST

## 2020-10-02 NOTE — PROGRESS NOTES
Physical Therapy Daily Progress Note      Patient: Keya Rogers   : 1940  Diagnosis/ICD-10 Code:  Degeneration of lumbar intervertebral disc [M51.36]   Problem List Items Addressed This Visit     None      Visit Diagnoses     Degeneration of lumbar intervertebral disc    -  Primary    Chronic bilateral low back pain, unspecified whether sciatica present        Pain, neck             Referring practitioner: Amie Sharma*  Date of Initial Visit: Type: THERAPY  Noted: 2020  Today's Date: 10/2/2020    VISIT#: 2    Subjective Patient states she was in pain all day yesterday so she finally took an aspirin and went to bed.  States the hand continues to tingle but the pain in her R upper arm has subsided.      Objective     See Exercise, Manual, and Modality Logs for complete treatment.     Assessment/Plan Patient reported no numbness or tingling in R hand during and following cervical distraction today.  She also reported decreased low back pain following LAD and SKTC stretches.  Progressed strengthening with no complaints of increased pain with activity.  Patient denied application of heat this date.    Progress per Plan of Care and Progress strengthening /stabilization /functional activity         Timed:         Manual Therapy:    15     mins  21097;     Therapeutic Exercise:    30     mins  84054;     Neuromuscular Estelle:        mins  41429;    Therapeutic Activity:          mins  00508;     Gait Training:           mins  18716;     Ultrasound:          mins  78578;    Ionto                                   mins   51337  Self Care                            mins   81759    Un-Timed:  Electrical Stimulation:         mins  35037 ( );  Dry Needling          mins self-pay  Traction          mins 05428  Low Eval          Mins  31920  Mod Eval          Mins  26396  High Eval                            Mins  14712  Re-Eval                               mins  93195    Timed Treatment:    45   mins   Total Treatment:     45   mins    Lolis Celestin, PT  Physical Therapist

## 2020-10-07 ENCOUNTER — TREATMENT (OUTPATIENT)
Dept: PHYSICAL THERAPY | Facility: CLINIC | Age: 80
End: 2020-10-07

## 2020-10-07 DIAGNOSIS — G89.29 CHRONIC BILATERAL LOW BACK PAIN, UNSPECIFIED WHETHER SCIATICA PRESENT: ICD-10-CM

## 2020-10-07 DIAGNOSIS — M54.2 PAIN, NECK: ICD-10-CM

## 2020-10-07 DIAGNOSIS — M51.36 DEGENERATION OF LUMBAR INTERVERTEBRAL DISC: Primary | ICD-10-CM

## 2020-10-07 DIAGNOSIS — M54.50 CHRONIC BILATERAL LOW BACK PAIN, UNSPECIFIED WHETHER SCIATICA PRESENT: ICD-10-CM

## 2020-10-07 PROCEDURE — 97110 THERAPEUTIC EXERCISES: CPT | Performed by: PHYSICAL THERAPIST

## 2020-10-07 NOTE — PROGRESS NOTES
Physical Therapy Daily Progress Note      Patient: Keya Rogers   : 1940  Diagnosis/ICD-10 Code:  Degeneration of lumbar intervertebral disc [M51.36]  Referring practitioner: Amie Sharma*  Date of Initial Visit: Type: THERAPY  Noted: 2020  Today's Date: 10/7/2020  Patient seen for 3 sessions         Keya Rogers reports: she does not lying on the hard mats in therapy and would like not to have to do that today. Pt. Reports she is having pain in L cervical/scapular region and L flank/rib area today.    Objective   See Exercise, Manual, and Modality Logs for complete treatment.     Assessment/Plan   Manual intervention was held due to pt. Intolerance to supine or side lying positioning. Pt. Partially follows instruction this date despite verbal cues and demonstration Pt. Still performs exercises or stretches her own way claiming she cannot perform actions as instructed. Despite multiple attempts to distinguish how many reps Pt. Should do for each individual activity Pt. Would perform extra repititions and then ask after each exercise how many reps she was supposed to do Pt. May benefit from therapist counting in future visits. Pt. Had no complaints of pain with exercise and no reports of tingling or numbness this date    Progress per Plan of Care           Timed:         Manual Therapy:         mins  07289;     Therapeutic Exercise:    39     mins  55439;     Neuromuscular Estelle:        mins  78820;    Therapeutic Activity:          mins  33863;     Gait Training:           mins  32298;     Ultrasound:          mins  24341;    Ionto                                   mins   21451  Self Care                            mins   27967  Canalith Repos                   mins  4209    Un-Timed:  Electrical Stimulation:         mins  99147 ( );  Dry Needling          mins self-pay  Traction          mins 34261  Low Eval          Mins  78043  Mod Eval          Mins  98413  High Eval                             Mins  52013    Timed Treatment:   39   mins   Total Treatment:     49   mins    Mariana Rea PTA  Physical Therapist Assistant License #[id number]       Name band;

## 2020-10-09 ENCOUNTER — TREATMENT (OUTPATIENT)
Dept: PHYSICAL THERAPY | Facility: CLINIC | Age: 80
End: 2020-10-09

## 2020-10-09 DIAGNOSIS — M54.50 CHRONIC BILATERAL LOW BACK PAIN, UNSPECIFIED WHETHER SCIATICA PRESENT: ICD-10-CM

## 2020-10-09 DIAGNOSIS — M54.2 PAIN, NECK: ICD-10-CM

## 2020-10-09 DIAGNOSIS — M51.36 DEGENERATION OF LUMBAR INTERVERTEBRAL DISC: Primary | ICD-10-CM

## 2020-10-09 DIAGNOSIS — G89.29 CHRONIC BILATERAL LOW BACK PAIN, UNSPECIFIED WHETHER SCIATICA PRESENT: ICD-10-CM

## 2020-10-09 PROCEDURE — 97110 THERAPEUTIC EXERCISES: CPT | Performed by: PHYSICAL THERAPIST

## 2020-10-09 PROCEDURE — 97530 THERAPEUTIC ACTIVITIES: CPT | Performed by: PHYSICAL THERAPIST

## 2020-10-09 NOTE — PROGRESS NOTES
Physical Therapy Daily Progress Note      Patient: Keya Rogers   : 1940  Diagnosis/ICD-10 Code:  Degeneration of lumbar intervertebral disc [M51.36]   Problem List Items Addressed This Visit     None      Visit Diagnoses     Degeneration of lumbar intervertebral disc    -  Primary    Chronic bilateral low back pain, unspecified whether sciatica present        Pain, neck             Referring practitioner: Amie Sharma*  Date of Initial Visit: Type: THERAPY  Noted: 2020  Today's Date: 10/9/2020    VISIT#: 4    Subjective Patient reports her low back pain and neck pain has improved but continues to be present.  She reports she is no longer getting pain in her RUE but continues to have some tingling in her R hand.      Objective     See Exercise, Manual, and Modality Logs for complete treatment.     Assessment/Plan     STG:  -Patient will report a reduction in low back by >/=25% for improved ability to perform transitions in 2-3 weeks. MET  -Patient will report no NT in R hand to indicate symptoms are centralizing in 2 weeks. NOT MET  -Patient report no pain when performing bed mobility and transitions in 3 weeks. PARTIALLY MET    LTG:  -Patient will demonstrate improved trunk mobility to WFL without complaints of low back pain by discharge. NOT MET  -Patient will report min to no back or RUE pain for improved ability to perform ADLs by discharge. PARTIALLY MET  -Patient will be able to perform stairs reciprocally by discharge. NOT MET  -Patient will be independent with HEP by discharge. NOT MET    Progressed exercise with addition of lat pulldowns with patient demonstrating good technique but occasional cues required to decrease shoulder hiking.  Increased time on nustep with no fatigue reported by patient. Sit to stands and LE strengthening performed for improved ability to perform transitions, especially from low surfaces and for improved bed mobility.     Progress per Plan of Care  and Progress strengthening /stabilization /functional activity         Timed:         Manual Therapy:         mins  36491;     Therapeutic Exercise:    25     mins  58296;     Neuromuscular Estelle:        mins  35330;    Therapeutic Activity:     15     mins  05327;     Gait Training:           mins  71344;     Ultrasound:          mins  57634;    Ionto                                   mins   80207  Self Care                            mins   01674    Un-Timed:  Electrical Stimulation:         mins  69552 ( );  Dry Needling          mins self-pay  Traction          mins 35191  Low Eval          Mins  35985  Mod Eval          Mins  74776  High Eval                            Mins  53176  Re-Eval                               mins  28218    Timed Treatment:   40   mins   Total Treatment:     55   mins    Lolis Celestin PT  Physical Therapist

## 2020-10-13 ENCOUNTER — TREATMENT (OUTPATIENT)
Dept: PHYSICAL THERAPY | Facility: CLINIC | Age: 80
End: 2020-10-13

## 2020-10-13 DIAGNOSIS — M54.2 PAIN, NECK: ICD-10-CM

## 2020-10-13 DIAGNOSIS — G89.29 CHRONIC BILATERAL LOW BACK PAIN, UNSPECIFIED WHETHER SCIATICA PRESENT: ICD-10-CM

## 2020-10-13 DIAGNOSIS — M54.50 CHRONIC BILATERAL LOW BACK PAIN, UNSPECIFIED WHETHER SCIATICA PRESENT: ICD-10-CM

## 2020-10-13 DIAGNOSIS — M51.36 DEGENERATION OF LUMBAR INTERVERTEBRAL DISC: Primary | ICD-10-CM

## 2020-10-13 PROCEDURE — 97110 THERAPEUTIC EXERCISES: CPT | Performed by: PHYSICAL THERAPIST

## 2020-10-13 PROCEDURE — 97530 THERAPEUTIC ACTIVITIES: CPT | Performed by: PHYSICAL THERAPIST

## 2020-10-15 ENCOUNTER — TREATMENT (OUTPATIENT)
Dept: PHYSICAL THERAPY | Facility: CLINIC | Age: 80
End: 2020-10-15

## 2020-10-15 DIAGNOSIS — M54.50 CHRONIC BILATERAL LOW BACK PAIN, UNSPECIFIED WHETHER SCIATICA PRESENT: ICD-10-CM

## 2020-10-15 DIAGNOSIS — M54.2 PAIN, NECK: ICD-10-CM

## 2020-10-15 DIAGNOSIS — M51.36 DEGENERATION OF LUMBAR INTERVERTEBRAL DISC: Primary | ICD-10-CM

## 2020-10-15 DIAGNOSIS — G89.29 CHRONIC BILATERAL LOW BACK PAIN, UNSPECIFIED WHETHER SCIATICA PRESENT: ICD-10-CM

## 2020-10-15 PROCEDURE — 97530 THERAPEUTIC ACTIVITIES: CPT | Performed by: PHYSICAL THERAPIST

## 2020-10-15 PROCEDURE — 97110 THERAPEUTIC EXERCISES: CPT | Performed by: PHYSICAL THERAPIST

## 2020-10-15 NOTE — PROGRESS NOTES
Physical Therapy Daily Progress Note      Patient: Keya Rogers   : 1940  Diagnosis/ICD-10 Code:  Degeneration of lumbar intervertebral disc [M51.36]   Problems Addressed this Visit     None      Visit Diagnoses     Degeneration of lumbar intervertebral disc    -  Primary    Chronic bilateral low back pain, unspecified whether sciatica present        Pain, neck          Diagnoses       Codes Comments    Degeneration of lumbar intervertebral disc    -  Primary ICD-10-CM: M51.36  ICD-9-CM: 722.52     Chronic bilateral low back pain, unspecified whether sciatica present     ICD-10-CM: M54.5, G89.29  ICD-9-CM: 724.2, 338.29     Pain, neck     ICD-10-CM: M54.2  ICD-9-CM: 723.1          Referring practitioner: Amie Sharma*  Date of Initial Visit: Type: THERAPY  Noted: 2020  Today's Date: 10/15/2020    VISIT#: 6    Subjective Patient reports no neck pain today.  She continues to complain of low back pain and reports she has pain at her R flank.  States her pain does not improve when she lies down at night.       Objective     See Exercise, Manual, and Modality Logs for complete treatment.     Assessment/Plan Added standing positional distraction with patient reporting improved symptoms in L low back.  She did report L low back pain when positional distraction performed on the R, but improved when she supported herself on a taller table.  Patient with fatigue reported throughout session, most notably with addition of standing hip strengthening exercises today. Sit to stands, squats, and hip strengthening performed for improved ability to perform transitions and stairs.     Progress per Plan of Care and Progress strengthening /stabilization /functional activity         Timed:         Manual Therapy:         mins  09068;     Therapeutic Exercise:    30     mins  07549;     Neuromuscular Estelle:        mins  78084;    Therapeutic Activity:     15     mins  14314;     Gait Training:            mins  34024;     Ultrasound:          mins  31670;    Ionto                                   mins   75741  Self Care                            mins   12279    Un-Timed:  Electrical Stimulation:         mins  22090 ( );  Dry Needling          mins self-pay  Traction          mins 36422  Low Eval          Mins  98572  Mod Eval          Mins  98225  High Eval                            Mins  58862  Re-Eval                               mins  32065    Timed Treatment:   45   mins   Total Treatment:     60   mins    Lolis Celestin PT  Physical Therapist

## 2020-10-20 ENCOUNTER — FLU SHOT (OUTPATIENT)
Dept: FAMILY MEDICINE CLINIC | Facility: CLINIC | Age: 80
End: 2020-10-20

## 2020-10-20 ENCOUNTER — TREATMENT (OUTPATIENT)
Dept: PHYSICAL THERAPY | Facility: CLINIC | Age: 80
End: 2020-10-20

## 2020-10-20 DIAGNOSIS — G89.29 CHRONIC BILATERAL LOW BACK PAIN, UNSPECIFIED WHETHER SCIATICA PRESENT: ICD-10-CM

## 2020-10-20 DIAGNOSIS — M54.50 CHRONIC BILATERAL LOW BACK PAIN, UNSPECIFIED WHETHER SCIATICA PRESENT: ICD-10-CM

## 2020-10-20 DIAGNOSIS — M54.2 PAIN, NECK: ICD-10-CM

## 2020-10-20 DIAGNOSIS — M51.36 DEGENERATION OF LUMBAR INTERVERTEBRAL DISC: Primary | ICD-10-CM

## 2020-10-20 DIAGNOSIS — Z23 NEED FOR IMMUNIZATION AGAINST INFLUENZA: Primary | ICD-10-CM

## 2020-10-20 PROCEDURE — 97110 THERAPEUTIC EXERCISES: CPT | Performed by: PHYSICAL THERAPIST

## 2020-10-20 PROCEDURE — G0008 ADMIN INFLUENZA VIRUS VAC: HCPCS | Performed by: FAMILY MEDICINE

## 2020-10-20 PROCEDURE — 90653 IIV ADJUVANT VACCINE IM: CPT | Performed by: FAMILY MEDICINE

## 2020-10-20 NOTE — PROGRESS NOTES
"     Physical Therapy Daily Progress Note      Patient: Keya Rogers   : 1940  Diagnosis/ICD-10 Code:  Degeneration of lumbar intervertebral disc [M51.36]   Problems Addressed this Visit     None      Visit Diagnoses     Degeneration of lumbar intervertebral disc    -  Primary    Chronic bilateral low back pain, unspecified whether sciatica present        Pain, neck          Diagnoses       Codes Comments    Degeneration of lumbar intervertebral disc    -  Primary ICD-10-CM: M51.36  ICD-9-CM: 722.52     Chronic bilateral low back pain, unspecified whether sciatica present     ICD-10-CM: M54.5, G89.29  ICD-9-CM: 724.2, 338.29     Pain, neck     ICD-10-CM: M54.2  ICD-9-CM: 723.1          Referring practitioner: Amie Sharma*  Date of Initial Visit: Type: THERAPY  Noted: 2020  Today's Date: 10/20/2020    VISIT#: 7    Subjective Patient reports her neck doesn't seem to \"pop and crack\" as much as it did prior to start of therapy.  States her back seems sore and stiff today but states she was unable to do her HEP over the weekend because she was cleaning out her garage.      Objective     See Exercise, Manual, and Modality Logs for complete treatment.     Assessment/Plan Patient reported mild increase in L low back/glute pain with standing hip abduction this date.  She described the pain as aching but improved after exercise was complete.  Added self traction in standing for lumbar spine with improved pain levels reported after.      Progress per Plan of Care and Progress strengthening /stabilization /functional activity         Timed:         Manual Therapy:         mins  10427;     Therapeutic Exercise:    30     mins  16168;     Neuromuscular Estelle:        mins  60399;    Therapeutic Activity:          mins  06502;     Gait Training:           mins  20187;     Ultrasound:          mins  04025;    Ionto                                   mins   67908  Self Care                            mins  "  71686    Un-Timed:  Electrical Stimulation:         mins  98472 ( );  Dry Needling          mins self-pay  Traction          mins 93028  Low Eval          Mins  69825  Mod Eval          Mins  85130  High Eval                            Mins  40178  Re-Eval                               mins  45854    Timed Treatment:   30   mins   Total Treatment:     45   mins    Lolis Celestin, PT  Physical Therapist

## 2020-10-23 ENCOUNTER — TREATMENT (OUTPATIENT)
Dept: PHYSICAL THERAPY | Facility: CLINIC | Age: 80
End: 2020-10-23

## 2020-10-23 DIAGNOSIS — M54.2 PAIN, NECK: ICD-10-CM

## 2020-10-23 DIAGNOSIS — M54.50 CHRONIC BILATERAL LOW BACK PAIN, UNSPECIFIED WHETHER SCIATICA PRESENT: ICD-10-CM

## 2020-10-23 DIAGNOSIS — G89.29 CHRONIC BILATERAL LOW BACK PAIN, UNSPECIFIED WHETHER SCIATICA PRESENT: ICD-10-CM

## 2020-10-23 DIAGNOSIS — M51.36 DEGENERATION OF LUMBAR INTERVERTEBRAL DISC: Primary | ICD-10-CM

## 2020-10-23 PROCEDURE — 97110 THERAPEUTIC EXERCISES: CPT | Performed by: PHYSICAL THERAPIST

## 2020-10-23 NOTE — PROGRESS NOTES
Physical Therapy Daily Progress Note      Patient: Keya Rogers   : 1940  Diagnosis/ICD-10 Code:  Degeneration of lumbar intervertebral disc [M51.36]  Referring practitioner: Amie Sharma*  Date of Initial Visit: Type: THERAPY  Noted: 2020  Today's Date: 10/23/2020  Patient seen for 8 sessions         Keya Rogers reports: she is achy and stiff today but also reports she is just starting her day into therapy and feels like the more she moves the better she feels. Pt. reports therapy does seem to be helping her feel and move better overall.     Objective   See Exercise, Manual, and Modality Logs for complete treatment.     Assessment/Plan   Pt.tolerates treatment well responding positively to manual cervical distraction for relief of UT tightness and symptoms into UE's. Pt. Performs all exercises and stretches well stating she can really feel the stretches when she does them but its a good feeling after.    Progress per Plan of Care           Timed:         Manual Therapy:    5     mins  13219;     Therapeutic Exercise:    30     mins  84134;     Neuromuscular Estelle:        mins  52683;    Therapeutic Activity:          mins  11406;     Gait Training:           mins  89560;     Ultrasound:          mins  58600;    Ionto                                   mins   86135  Self Care                            mins   74722  Canalith Repos                   mins  4209    Un-Timed:  Electrical Stimulation:         mins  91918 ( );  Dry Needling          mins self-pay  Traction          mins 33133  Low Eval          Mins  45771  Mod Eval          Mins  37257  High Eval                            Mins  28974    Timed Treatment:   35   mins   Total Treatment:     50   mins    Mariana Rea PTA  Physical Therapist Assistant License #[id number]

## 2020-10-30 ENCOUNTER — TREATMENT (OUTPATIENT)
Dept: PHYSICAL THERAPY | Facility: CLINIC | Age: 80
End: 2020-10-30

## 2020-10-30 DIAGNOSIS — G89.29 CHRONIC BILATERAL LOW BACK PAIN, UNSPECIFIED WHETHER SCIATICA PRESENT: ICD-10-CM

## 2020-10-30 DIAGNOSIS — M51.36 DEGENERATION OF LUMBAR INTERVERTEBRAL DISC: Primary | ICD-10-CM

## 2020-10-30 DIAGNOSIS — M54.50 CHRONIC BILATERAL LOW BACK PAIN, UNSPECIFIED WHETHER SCIATICA PRESENT: ICD-10-CM

## 2020-10-30 DIAGNOSIS — M54.2 PAIN, NECK: ICD-10-CM

## 2020-10-30 PROCEDURE — 97140 MANUAL THERAPY 1/> REGIONS: CPT | Performed by: PHYSICAL THERAPIST

## 2020-10-30 PROCEDURE — 97530 THERAPEUTIC ACTIVITIES: CPT | Performed by: PHYSICAL THERAPIST

## 2020-10-30 PROCEDURE — 97110 THERAPEUTIC EXERCISES: CPT | Performed by: PHYSICAL THERAPIST

## 2020-10-30 NOTE — PROGRESS NOTES
Physical Therapy Daily Progress Note      Patient: Keya Rogers   : 1940  Diagnosis/ICD-10 Code:  Degeneration of lumbar intervertebral disc [M51.36]   Problems Addressed this Visit     None      Visit Diagnoses     Degeneration of lumbar intervertebral disc    -  Primary    Chronic bilateral low back pain, unspecified whether sciatica present        Pain, neck          Diagnoses       Codes Comments    Degeneration of lumbar intervertebral disc    -  Primary ICD-10-CM: M51.36  ICD-9-CM: 722.52     Chronic bilateral low back pain, unspecified whether sciatica present     ICD-10-CM: M54.5, G89.29  ICD-9-CM: 724.2, 338.29     Pain, neck     ICD-10-CM: M54.2  ICD-9-CM: 723.1          Referring practitioner: Amie Sharma*  Date of Initial Visit: Type: THERAPY  Noted: 2020  Today's Date: 10/30/2020    VISIT#: 9    Subjective Patient reports her neck pain and back pain have been improving but low back and hips continue to be the biggest problem.  Her pain improves after she gets up and moves around but still has some good and bad days.      Objective     See Exercise, Manual, and Modality Logs for complete treatment.     Assessment/Plan Progressed LE and UE strengthening with no complaints of increased pain with activity.  She did report fatigue of BLEs with addition of hip extension and sit to stands without use of BUEs.  Sit to stands, squats, and LE strengthening perform for improved ability to perform transitions and tolerance to ADLs.     Progress per Plan of Care and Progress strengthening /stabilization /functional activity         Timed:         Manual Therapy:    10     mins  13275;     Therapeutic Exercise:    20     mins  79238;     Neuromuscular Estelle:        mins  86202;    Therapeutic Activity:     15     mins  21517;     Gait Training:           mins  65694;     Ultrasound:          mins  85823;    Ionto                                   mins   79988  Self Care                             mins   75200    Un-Timed:  Electrical Stimulation:         mins  63088 ( );  Dry Needling          mins self-pay  Traction          mins 36484  Low Eval          Mins  24542  Mod Eval          Mins  75123  High Eval                            Mins  16740  Re-Eval                               mins  02175    Timed Treatment:   45   mins   Total Treatment:     55   mins    Lolis Celestin, PT  Physical Therapist

## 2020-11-06 ENCOUNTER — TREATMENT (OUTPATIENT)
Dept: PHYSICAL THERAPY | Facility: CLINIC | Age: 80
End: 2020-11-06

## 2020-11-06 DIAGNOSIS — M51.36 DEGENERATION OF LUMBAR INTERVERTEBRAL DISC: Primary | ICD-10-CM

## 2020-11-06 DIAGNOSIS — M54.2 PAIN, NECK: ICD-10-CM

## 2020-11-06 DIAGNOSIS — G89.29 CHRONIC BILATERAL LOW BACK PAIN, UNSPECIFIED WHETHER SCIATICA PRESENT: ICD-10-CM

## 2020-11-06 DIAGNOSIS — M54.50 CHRONIC BILATERAL LOW BACK PAIN, UNSPECIFIED WHETHER SCIATICA PRESENT: ICD-10-CM

## 2020-11-06 PROCEDURE — 97110 THERAPEUTIC EXERCISES: CPT | Performed by: PHYSICAL THERAPIST

## 2020-11-06 PROCEDURE — 97530 THERAPEUTIC ACTIVITIES: CPT | Performed by: PHYSICAL THERAPIST

## 2020-11-06 NOTE — PROGRESS NOTES
Physical Therapy Reassessment Progress Note      Patient: Keya Rogers   : 1940  Diagnosis/ICD-10 Code:  Degeneration of lumbar intervertebral disc [M51.36]   Problems Addressed this Visit     None      Visit Diagnoses     Degeneration of lumbar intervertebral disc    -  Primary    Chronic bilateral low back pain, unspecified whether sciatica present        Pain, neck          Diagnoses       Codes Comments    Degeneration of lumbar intervertebral disc    -  Primary ICD-10-CM: M51.36  ICD-9-CM: 722.52     Chronic bilateral low back pain, unspecified whether sciatica present     ICD-10-CM: M54.5, G89.29  ICD-9-CM: 724.2, 338.29     Pain, neck     ICD-10-CM: M54.2  ICD-9-CM: 723.1          Referring practitioner: Amie Sharma*  Date of Initial Visit: Type: THERAPY  Noted: 2020  Today's Date: 2020    VISIT#: 10    Subjective Questionnaire: NDI 22%, Oswestry 34%    Subjective Patient reports no neck or RUE pain today.  Also states she has not experienced NT into RUE for the past several weeks.  Patient reports her low back and glute pain is intermittent and overall less intense.        Objective   Trunk flexion WFL sitting  Trunk flexion standing limited by ~25% today due to complaints of feeling lightheaded  Trunk Lateral flexion WFL  See Exercise, Manual, and Modality Logs for complete treatment.     Assessment/Plan   STG:  -Patient will report a reduction in low back by >/=25% for improved ability to perform transitions in 2-3 weeks. MET  -Patient will report no NT in R hand to indicate symptoms are centralizing in 2 weeks. MET   -Patient report no pain when performing bed mobility and transitions in 3 weeks. PARTIALLY MET    LTG:  -Patient will demonstrate improved trunk mobility to WFL without complaints of low back pain by discharge. MET  -Patient will report min to no back or RUE pain for improved ability to perform ADLs by discharge. MET  -Patient will be able to perform  stairs reciprocally by discharge. MET  -Patient will be independent with HEP by discharge.  NOT MET    Patient has attended 10 PT sessions with slow but steady progress towards goals.  Patient demonstrates trunk flexion to WNL in sitting but limited in standing this date due to lightheadedness.  She is able to perform stairs reciprocally with use of handrails and perform transitions without complaints of pain. Continues to report mild to mod low back pain with bed mobility.  Plan for patient to attend 1 additional visit to progress HEP and then d/c with HEP.    Anticipate DC next Visit         Timed:         Manual Therapy:         mins  14240;     Therapeutic Exercise:    20     mins  55528;     Neuromuscular Estelle:        mins  58628;    Therapeutic Activity:     15     mins  55659;     Gait Training:           mins  88460;     Ultrasound:          mins  59994;    Ionto                                   mins   04189  Self Care                            mins   68439    Un-Timed:  Electrical Stimulation:         mins  45209 ( );  Dry Needling          mins self-pay  Traction          mins 87042  Low Eval          Mins  34450  Mod Eval          Mins  26708  High Eval                            Mins  92297  Re-Eval                               mins  93101    Timed Treatment:   35   mins   Total Treatment:     45   mins    Lolis Celestin, PT  Physical Therapist

## 2020-11-13 ENCOUNTER — TREATMENT (OUTPATIENT)
Dept: PHYSICAL THERAPY | Facility: CLINIC | Age: 80
End: 2020-11-13

## 2020-11-13 ENCOUNTER — CLINICAL SUPPORT (OUTPATIENT)
Dept: FAMILY MEDICINE CLINIC | Facility: CLINIC | Age: 80
End: 2020-11-13

## 2020-11-13 DIAGNOSIS — M51.36 DEGENERATION OF LUMBAR INTERVERTEBRAL DISC: Primary | ICD-10-CM

## 2020-11-13 DIAGNOSIS — M54.2 PAIN, NECK: ICD-10-CM

## 2020-11-13 DIAGNOSIS — M54.50 CHRONIC BILATERAL LOW BACK PAIN, UNSPECIFIED WHETHER SCIATICA PRESENT: ICD-10-CM

## 2020-11-13 DIAGNOSIS — G89.29 CHRONIC BILATERAL LOW BACK PAIN, UNSPECIFIED WHETHER SCIATICA PRESENT: ICD-10-CM

## 2020-11-13 DIAGNOSIS — Z23 NEED FOR 23-POLYVALENT PNEUMOCOCCAL POLYSACCHARIDE VACCINE: Primary | ICD-10-CM

## 2020-11-13 PROCEDURE — G0009 ADMIN PNEUMOCOCCAL VACCINE: HCPCS | Performed by: FAMILY MEDICINE

## 2020-11-13 PROCEDURE — 97110 THERAPEUTIC EXERCISES: CPT | Performed by: PHYSICAL THERAPIST

## 2020-11-13 PROCEDURE — 90732 PPSV23 VACC 2 YRS+ SUBQ/IM: CPT | Performed by: FAMILY MEDICINE

## 2020-11-13 PROCEDURE — 97530 THERAPEUTIC ACTIVITIES: CPT | Performed by: PHYSICAL THERAPIST

## 2020-11-13 NOTE — PROGRESS NOTES
Physical Therapy Daily Progress Note      Patient: Keya Rogers   : 1940  Diagnosis/ICD-10 Code:  Degeneration of lumbar intervertebral disc [M51.36]   Problems Addressed this Visit     None      Visit Diagnoses     Degeneration of lumbar intervertebral disc    -  Primary    Chronic bilateral low back pain, unspecified whether sciatica present        Pain, neck          Diagnoses       Codes Comments    Degeneration of lumbar intervertebral disc    -  Primary ICD-10-CM: M51.36  ICD-9-CM: 722.52     Chronic bilateral low back pain, unspecified whether sciatica present     ICD-10-CM: M54.5, G89.29  ICD-9-CM: 724.2, 338.29     Pain, neck     ICD-10-CM: M54.2  ICD-9-CM: 723.1          Referring practitioner: Amie Sharma*  Date of Initial Visit: Type: THERAPY  Noted: 2020  Today's Date: 2020    VISIT#: 11     Subjective Questionnaire: NDI 22%, Oswestry 34%    Subjective Patient reports feeling anxious today due to her upcoming travel to [place], but is looking forward to being able to walk more on the walking trails.      Objective   Lumbar flexion WNL   Lumbar extension WNL  B lumbar lateral flexion WFL  See Exercise, Manual, and Modality Logs for complete treatment.     Assessment/Plan   STG:  -Patient will report a reduction in low back by >/=25% for improved ability to perform transitions in 2-3 weeks. MET  -Patient will report no NT in R hand to indicate symptoms are centralizing in 2 weeks. MET   -Patient report no pain when performing bed mobility and transitions in 3 weeks. PARTIALLY MET    LTG:  -Patient will demonstrate improved trunk mobility to WFL without complaints of low back pain by discharge. MET  -Patient will report min to no back or RUE pain for improved ability to perform ADLs by discharge. MET  -Patient will be able to perform stairs reciprocally by discharge. MET  -Patient will be independent with HEP by discharge.  MET    Patient has attended 11 PT  sessions with 2/3 STGs and 4/4 LTGs met.  Patient demonstrates trunk ROM to WFL without complaints of pain.  Patient reports no NT into RUE and no neck pain.  Mild glute and hip pain reported with bed mobility and transitions but is intermittent. Reviewed patient's HEP and educated to continue as tolerated.  Patient verbalized understanding.     Plan to DC with HEP         Timed:         Manual Therapy:         mins  29242;     Therapeutic Exercise:    25     mins  54001;     Neuromuscular Estelle:        mins  44814;    Therapeutic Activity:     15     mins  34551;     Gait Training:           mins  70627;     Ultrasound:          mins  92085;    Ionto                                   mins   77564  Self Care                            mins   45581    Un-Timed:  Electrical Stimulation:         mins  65356 ( );  Dry Needling          mins self-pay  Traction          mins 87611  Low Eval          Mins  50028  Mod Eval          Mins  60800  High Eval                            Mins  43389  Re-Eval                               mins  61138    Timed Treatment:   40   mins   Total Treatment:     40   mins    Lolis Celestin, PT  Physical Therapist    Discharge Summary  Discharge Summary from Physical Therapy Report      Dates  PT visit: 9/29 - 11/13  Number of Visits: 11     Discharge Status of Patient: See MD Note dated 11/13    Goals: All Met    Discharge Plan: Continue with current home exercise program as instructed    Comments See above assessment    Date of Discharge 11/13        Lolis Celestin, PT  Physical Therapist                       Yes

## 2020-11-14 ENCOUNTER — TELEPHONE (OUTPATIENT)
Dept: FAMILY MEDICINE CLINIC | Facility: CLINIC | Age: 80
End: 2020-11-14

## 2020-11-14 NOTE — TELEPHONE ENCOUNTER
Pt had a local reaction with swelling at the site but no fever or chillls. Tylenol, benadryl and ice --call if further problems

## 2021-01-15 DIAGNOSIS — F41.9 ANXIETY: ICD-10-CM

## 2021-01-15 RX ORDER — ALPRAZOLAM 1 MG/1
0.5 TABLET ORAL DAILY PRN
Qty: 30 TABLET | Refills: 2 | Status: SHIPPED | OUTPATIENT
Start: 2021-01-15 | End: 2021-01-18 | Stop reason: SDUPTHER

## 2021-01-18 ENCOUNTER — TELEPHONE (OUTPATIENT)
Dept: FAMILY MEDICINE CLINIC | Facility: CLINIC | Age: 81
End: 2021-01-18

## 2021-01-18 DIAGNOSIS — F41.9 ANXIETY: ICD-10-CM

## 2021-01-18 RX ORDER — ALPRAZOLAM 1 MG/1
0.5 TABLET ORAL DAILY PRN
Qty: 30 TABLET | Refills: 0 | Status: SHIPPED | OUTPATIENT
Start: 2021-01-18 | End: 2021-10-02 | Stop reason: SDUPTHER

## 2021-01-18 NOTE — TELEPHONE ENCOUNTER
Patient called and said that she is in [facility] and needs her Alprazolam sent to [facility] at [address] [place], [place] not to the one in [place]. Please contact her at . Thanks Fatoumata

## 2021-06-10 ENCOUNTER — TREATMENT (OUTPATIENT)
Dept: PHYSICAL THERAPY | Facility: CLINIC | Age: 81
End: 2021-06-10

## 2021-06-10 DIAGNOSIS — R26.9 GAIT DISTURBANCE: ICD-10-CM

## 2021-06-10 DIAGNOSIS — M25.652 IMPAIRED RANGE OF MOTION OF LEFT HIP: ICD-10-CM

## 2021-06-10 DIAGNOSIS — Z96.642 STATUS POST TOTAL HIP REPLACEMENT, LEFT: Primary | ICD-10-CM

## 2021-06-10 DIAGNOSIS — R29.898 WEAKNESS OF LEFT HIP: ICD-10-CM

## 2021-06-10 PROCEDURE — 97162 PT EVAL MOD COMPLEX 30 MIN: CPT | Performed by: PHYSICAL THERAPIST

## 2021-06-10 PROCEDURE — 97110 THERAPEUTIC EXERCISES: CPT | Performed by: PHYSICAL THERAPIST

## 2021-06-10 PROCEDURE — 97530 THERAPEUTIC ACTIVITIES: CPT | Performed by: PHYSICAL THERAPIST
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
## 2021-06-14 ENCOUNTER — TREATMENT (OUTPATIENT)
Dept: PHYSICAL THERAPY | Facility: CLINIC | Age: 81
End: 2021-06-14

## 2021-06-14 DIAGNOSIS — Z73.6 LIMITATION OF ACTIVITIES DUE TO MUSCULOSKELETAL DISORDER: ICD-10-CM

## 2021-06-14 DIAGNOSIS — M79.9 LIMITATION OF ACTIVITIES DUE TO MUSCULOSKELETAL DISORDER: ICD-10-CM

## 2021-06-14 DIAGNOSIS — S52.532A CLOSED COLLES' FRACTURE OF LEFT RADIUS, INITIAL ENCOUNTER: Primary | ICD-10-CM

## 2021-06-14 PROCEDURE — 97110 THERAPEUTIC EXERCISES: CPT | Performed by: PHYSICAL THERAPIST

## 2021-06-14 PROCEDURE — 97535 SELF CARE MNGMENT TRAINING: CPT | Performed by: OCCUPATIONAL THERAPIST

## 2021-06-14 PROCEDURE — 97166 OT EVAL MOD COMPLEX 45 MIN: CPT | Performed by: OCCUPATIONAL THERAPIST
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
## 2021-06-15 NOTE — PROGRESS NOTES
Physical Therapy Daily Progress Note    VISIT#: 2    Subjective   Keya Rogers reports: She has been home with her grandson assisting her. Feels as if she should be further along in her recovery a this point.     Current Pain Level: 3/10    Objective     See Exercise, Manual, and Modality Logs for complete treatment.     Patient Education: Continue to perform HEP 2x/day     Assessment/Plan Mrs. Rogers tolerates session fair today, requires assistance and encouragement to mobilize despite discomfort. Assistance required for hip flexion type activities.          Continue per POC.      Timed:         Manual Therapy:         mins  84806;     Therapeutic Exercise:    30     mins  47733;     Neuromuscular Estelle:        mins  58282;    Therapeutic Activity:          mins  80703;     Gait Training:           mins  04390;     Ultrasound:          mins  58477;    Ionto                                   mins   22255  Self Care                            mins   42453      Un-Timed:  Electrical Stimulation:         mins  81760 ( );  Traction          mins 13154      Timed Treatment:   30   mins   Total Treatment:     30   mins      Karen Lord PTA  Physical Therapist Assistant

## 2021-06-16 ENCOUNTER — TREATMENT (OUTPATIENT)
Dept: PHYSICAL THERAPY | Facility: CLINIC | Age: 81
End: 2021-06-16

## 2021-06-16 DIAGNOSIS — M25.652 IMPAIRED RANGE OF MOTION OF LEFT HIP: ICD-10-CM

## 2021-06-16 DIAGNOSIS — Z96.642 STATUS POST TOTAL HIP REPLACEMENT, LEFT: Primary | ICD-10-CM

## 2021-06-16 DIAGNOSIS — R26.9 GAIT DISTURBANCE: ICD-10-CM

## 2021-06-16 DIAGNOSIS — S52.532A CLOSED COLLES' FRACTURE OF LEFT RADIUS, INITIAL ENCOUNTER: Primary | ICD-10-CM

## 2021-06-16 PROCEDURE — 97110 THERAPEUTIC EXERCISES: CPT | Performed by: PHYSICAL THERAPIST

## 2021-06-16 PROCEDURE — 97140 MANUAL THERAPY 1/> REGIONS: CPT | Performed by: OCCUPATIONAL THERAPIST

## 2021-06-16 PROCEDURE — 97530 THERAPEUTIC ACTIVITIES: CPT | Performed by: PHYSICAL THERAPIST

## 2021-06-16 PROCEDURE — 97110 THERAPEUTIC EXERCISES: CPT | Performed by: OCCUPATIONAL THERAPIST

## 2021-06-16 NOTE — PROGRESS NOTES
Physical Therapy Daily Progress Note    VISIT#: 3    Subjective   Keya Rogers reports: she drove herself to the clinic this morning which made her really anxious. Has not been doing any ex's, but has been busy with household work.     Current Pain Level: fairly low.     Objective     See Exercise, Manual, and Modality Logs for complete treatment.     Patient Education: HEP handout given so that pt can perform at home with assist from grandson as needed.      Standing Heel Raise with Support - 2 x daily - 7 x weekly - 2 sets - 10 reps   Standing Hip Extension with Chair - 2 x daily - 7 x weekly - 2 sets - 10 reps   Standing Hip Flexion AROM - 2 x daily - 7 x weekly - 2 sets - 10 reps   Standing Hip Abduction AROM - 2 x daily - 7 x weekly - 2 sets - 10 reps   Standing Marching - 2 x daily - 7 x weekly - 2 sets - 10 reps   Seated Long Arc Quad - 2 x daily - 7 x weekly - 2 sets - 10 reps - 5 second hold   Seated Hip Adduction Isometrics with Ball - 2 x daily - 7 x weekly - 2 sets - 10 reps - 10 second hold   Seated Hip Abduction with Resistance - 2 x daily - 7 x weekly - 2 sets - 10 reps - 10 second hold   Sit to Stand - 2 x daily - 7 x weekly - 10 sets - 10 reps      Assessment/Plan Pt has minimal tolerance to supine exs due to dizziness on laying down. Improved tolerance to ex's and activities within the clinic this session. Minimal cueing for techniques and good understanding of HEP, but appreciates the printouts with pictures and written instructions. Gait within clinic without use of AD is nearly identical CULLEN with diminished heel strike, reduced step and stride length and with slight ER. Muscle fatigue limits distance.          Continue per POC.      Timed:         Manual Therapy:         mins  85419;     Therapeutic Exercise:    30     mins  74483;     Neuromuscular Estelle:        mins  62975;    Therapeutic Activity:    8    mins  42351;     Gait Training:           mins  71773;     Ultrasound:          mins   89783;    Ionto                                   mins   43980  Self Care                            mins   82333      Un-Timed:  Electrical Stimulation:         mins  12136 ( );  Traction          mins 08606      Timed Treatment:   38   mins   Total Treatment:     38   mins      Karen Lord PTA  Physical Therapist Assistant

## 2021-06-16 NOTE — PROGRESS NOTES
OccupationalTherapy Daily Progress Note        Patient: Keya Rogers   : 1940  Diagnosis/ICD-10 Code:  Closed Colles' fracture of left radius, initial encounter [S52.532A]  Referring practitioner: GAYLE Amaya  Date of Initial Visit: Type: THERAPY  Noted: 2021  Today's Date: 2021  Patient seen for 2 sessions            Subjective  Pt drove herself and stated she could hold the steering wheel better     Objective   Improved wrist flex at 60  and extension at 40  Supination -10 degree   Digits at 4 in from DPC    See Exercise, Manual, and Modality Logs for complete treatment.       Assessment/Plan  Continue to engage pt in functional tasks and review activities she should do at home   Discussing with pt the need to increase tx time to 60 min     Progress per Plan of Care           Timed Codes        Manual Therapy:    15     mins  75535;    Therapeutic Exercise:     15    mins  40202;     Neuromuscular Estelle:        mins  03553;    Therapeutic Activity:          mins  42615;      Ultrasound:          mins  63466;    Community/ work         mins  54839  Self Care/ Nellie         mins  98866  Sensory Re-Int.                  mins   36454  Cognitve Re-train         mins  85084     Un-timed Codes  Electrical Stimulation:         mins 9 06135 ( );      Timed Treatment:  30    mins   Total Treatment:    30    mins    Stacey Lepe OT  Occupational Therapist

## 2021-06-23 ENCOUNTER — TREATMENT (OUTPATIENT)
Dept: PHYSICAL THERAPY | Facility: CLINIC | Age: 81
End: 2021-06-23

## 2021-06-23 DIAGNOSIS — R29.898 WEAKNESS OF LEFT HIP: ICD-10-CM

## 2021-06-23 DIAGNOSIS — M79.9 LIMITATION OF ACTIVITIES DUE TO MUSCULOSKELETAL DISORDER: Primary | ICD-10-CM

## 2021-06-23 DIAGNOSIS — R26.9 GAIT DISTURBANCE: ICD-10-CM

## 2021-06-23 DIAGNOSIS — Z73.6 LIMITATION OF ACTIVITIES DUE TO MUSCULOSKELETAL DISORDER: Primary | ICD-10-CM

## 2021-06-23 DIAGNOSIS — Z96.642 STATUS POST TOTAL HIP REPLACEMENT, LEFT: Primary | ICD-10-CM

## 2021-06-23 PROCEDURE — 97110 THERAPEUTIC EXERCISES: CPT | Performed by: OCCUPATIONAL THERAPIST

## 2021-06-23 PROCEDURE — 97110 THERAPEUTIC EXERCISES: CPT | Performed by: PHYSICAL THERAPIST

## 2021-06-23 NOTE — PROGRESS NOTES
OccupationalTherapy Daily Progress Note        Patient: Keya Rogers   : 1940  Diagnosis/ICD-10 Code:  Limitation of activities due to musculoskeletal disorder [Z73.6, M79.9]  Referring practitioner: GAYLE Amaya  Date of Initial Visit: Type: THERAPY  Noted: 2021  Today's Date: 2021  Patient seen for 3 sessions                 Subjective  Pt stating that she was just very anxious this AM     Objective   Pt able to perform opposition n  Pt able s/p treatment to bring fingertips to 1 cm from DPC  Pt continues to report pain at 3-4/10 with all exercises   See Exercise, Manual, and Modality Logs for complete treatment.       Assessment/Plan  Pt reporting she had better  on her steering wheel   Pt does report that she still does not use her hand automatically in ADL   Progress per Plan of Care           Timed Codes        Manual Therapy:         mins  70762;    Therapeutic Exercise:     30    mins  68000;     Neuromuscular Estelle:        mins  56954;    Therapeutic Activity:          mins  11249;      Ultrasound:          mins  59158;    Community/ work         mins  91011  Self Care/ Nellie         mins  91615  Sensory Re-Int.                  mins   90605  Cognitve Re-train         mins  31052     Un-timed Codes  Electrical Stimulation:         mins 9 23382 ( );      Timed Treatment:  30    mins   Total Treatment:      30  mins    Stacey Lepe OT  Occupational Therapist

## 2021-06-23 NOTE — PROGRESS NOTES
Physical Therapy Daily Progress Note    Patient: Keya Rogers  : 1940  Referring practitioner: GAYLE Amaya  Today's Date: 2021    VISIT#: 4    Subjective   Keya Rogers reports: Pt was 15 minutes late, says she is flustered and anxious d/t traffic and a long drive.       Objective     See Exercise, Manual, and Modality Logs for complete treatment.     Patient Education:     Assessment & Plan     Assessment  Assessment details: Good response to session, short session today d/t patient was late and then had OT appointment immediately following PT.           Progress per Plan of Care            Timed:         Manual Therapy:         mins  65586;     Therapeutic Exercise:    10     mins  41926;     Neuromuscular Estelle:        mins  07593;    Therapeutic Activity:     5     mins  69849;     Gait Training:           mins  47438;     Ultrasound:          mins  43863;    Ionto:                                   mins   39469  Self Care:                            mins   84550    Un-Timed:  Electrical Stimulation:         mins  22965 (MC );  Dry Needling          mins self-pay  Traction          mins 83307  Re-Eval                               mins  52996    Timed Treatment:   15   mins   Total Treatment:     15   mins    Barbra Fry PT  Physical Therapist

## 2021-06-25 ENCOUNTER — TREATMENT (OUTPATIENT)
Dept: PHYSICAL THERAPY | Facility: CLINIC | Age: 81
End: 2021-06-25

## 2021-06-25 DIAGNOSIS — S52.532A CLOSED COLLES' FRACTURE OF LEFT RADIUS, INITIAL ENCOUNTER: Primary | ICD-10-CM

## 2021-06-25 PROCEDURE — 97140 MANUAL THERAPY 1/> REGIONS: CPT | Performed by: OCCUPATIONAL THERAPIST

## 2021-06-25 PROCEDURE — 97530 THERAPEUTIC ACTIVITIES: CPT | Performed by: OCCUPATIONAL THERAPIST

## 2021-06-25 PROCEDURE — 97110 THERAPEUTIC EXERCISES: CPT | Performed by: OCCUPATIONAL THERAPIST

## 2021-06-25 NOTE — PROGRESS NOTES
OccupationalTherapy Daily Progress Note        Patient: Keya Rogers   : 1940  Diagnosis/ICD-10 Code:  Closed Colles' fracture of left radius, initial encounter [S52.532A]  Referring practitioner: GAYLE Amaya  Date of Initial Visit: Type: THERAPY  Noted: 2021  Today's Date: 2021  Patient seen for 4 sessions                 Subjective   Pt left hand skin appears shinny     Objective    right 35 psi    Left  15psi   9 hole peg with left hand 25 sec     Wrist flex  And ext  45 degree  Supination WFL  Fingertip from DPC  3.5 cm     See Exercise, Manual, and Modality Logs for complete treatment.       Assessment/Plan  Pt does not appear to be using hand in Normal daily activities   Continue to encourage HEP   Progress per Plan of Care           Timed Codes        Manual Therapy:   15      mins  26819;    Therapeutic Exercise:     15    mins  82648;     Neuromuscular Estelle:        mins  34950;    Therapeutic Activity:    15     mins  51025;      Ultrasound:          mins  37155;    Community/ work         mins  86015  Self Care/ Nellie         mins  48402  Sensory Re-Int.                  mins   39846  Cognitve Re-train         mins  60079     Un-timed Codes  Electrical Stimulation:         mins 9 36491 ( );      Timed Treatment:  45    mins   Total Treatment:     45   mins    Stacey Lepe OT  Occupational Therapist

## 2021-06-28 ENCOUNTER — TREATMENT (OUTPATIENT)
Dept: PHYSICAL THERAPY | Facility: CLINIC | Age: 81
End: 2021-06-28

## 2021-06-28 DIAGNOSIS — M79.9 LIMITATION OF ACTIVITIES DUE TO MUSCULOSKELETAL DISORDER: ICD-10-CM

## 2021-06-28 DIAGNOSIS — S52.532A CLOSED COLLES' FRACTURE OF LEFT RADIUS, INITIAL ENCOUNTER: Primary | ICD-10-CM

## 2021-06-28 DIAGNOSIS — M25.652 IMPAIRED RANGE OF MOTION OF LEFT HIP: ICD-10-CM

## 2021-06-28 DIAGNOSIS — Z96.642 STATUS POST TOTAL HIP REPLACEMENT, LEFT: Primary | ICD-10-CM

## 2021-06-28 DIAGNOSIS — R29.898 WEAKNESS OF LEFT HIP: ICD-10-CM

## 2021-06-28 DIAGNOSIS — R26.9 GAIT DISTURBANCE: ICD-10-CM

## 2021-06-28 DIAGNOSIS — Z73.6 LIMITATION OF ACTIVITIES DUE TO MUSCULOSKELETAL DISORDER: ICD-10-CM

## 2021-06-28 PROCEDURE — 97110 THERAPEUTIC EXERCISES: CPT | Performed by: PHYSICAL THERAPIST

## 2021-06-28 PROCEDURE — 97140 MANUAL THERAPY 1/> REGIONS: CPT | Performed by: OCCUPATIONAL THERAPIST

## 2021-06-28 PROCEDURE — 97110 THERAPEUTIC EXERCISES: CPT | Performed by: OCCUPATIONAL THERAPIST

## 2021-06-28 PROCEDURE — 97530 THERAPEUTIC ACTIVITIES: CPT | Performed by: PHYSICAL THERAPIST

## 2021-06-28 PROCEDURE — 97530 THERAPEUTIC ACTIVITIES: CPT | Performed by: OCCUPATIONAL THERAPIST

## 2021-06-28 NOTE — PROGRESS NOTES
Physical Therapy Daily Progress Note    Patient: Kyea Rogers  : 1940  Referring practitioner: GAYLE Amaya  Today's Date: 2021    VISIT#: 5    Subjective   Keya Rogers reports: Says she is doing pretty well, was able to put pants on without reacher yesterday.       Objective     See Exercise, Manual, and Modality Logs for complete treatment.     Patient Education:    Assessment/Plan  Good response to session, minimal rest breaks required.     Progress per Plan of Care, incorporate balance training next session.           Timed:         Manual Therapy:         mins  12152;     Therapeutic Exercise:    20     mins  66758;     Neuromuscular Estelle:        mins  98722;    Therapeutic Activity:     10     mins  14405;     Gait Training:           mins  42296;     Ultrasound:          mins  98404;    Ionto:                                   mins   33342  Self Care:                            mins   85056    Un-Timed:  Electrical Stimulation:         mins  30859 ( );  Dry Needling          mins self-pay  Traction          mins 31428  Re-Eval                               mins  56211    Timed Treatment:   30   mins   Total Treatment:     30   mins    Barbra Fry PT  Physical Therapist

## 2021-06-28 NOTE — PROGRESS NOTES
OccupationalTherapy Daily Progress Note        Patient: Keya Rogers   : 1940  Diagnosis/ICD-10 Code:  Closed Colles' fracture of left radius, initial encounter [S52.532A]  Referring practitioner: GAYLE Amaya  Date of Initial Visit: Type: THERAPY  Noted: 2021  Today's Date: 2021  Patient seen for 5 sessions            Subjective  Pt stating that she feels better    I had less difficulty getting here    Pt however arrived 5 min late    Objective   S/p tx  Pt is able to bring fingertips to palm   Each visit she is again stiff and unable to bring tips to palm   In discussion with the patient she continues to not use the Left hand with integrative tasks    Discussed again with her the need to use the hand in more daily tasks   Added therapeutic tasks of squeezing bottle, buttoning and tying   Pt is reporting less pain with PROM and simulated tasks   See Exercise, Manual, and Modality Logs for complete treatment.       Assessment/Plan  Pt is improving but need to continue with integrative functional hand tasks   Progress per Plan of Care           Timed Codes        Manual Therapy:   15     mins  51429;    Therapeutic Exercise:     15    mins  92151;     Neuromuscular Estelle:        mins  93647;    Therapeutic Activity:   15       mins  54219;      Ultrasound:          mins  14635;    Community/ work         mins  34920  Self Care/ Nellie         mins  08660  Sensory Re-Int.                  mins   51161  Cognitve Re-train         mins  25560     Un-timed Codes  Electrical Stimulation:         mins 9 78898 ( );      Timed Treatment:   45   mins   Total Treatment:     45   mins    Stacey Lepe OT  Occupational Therapist

## 2021-07-02 ENCOUNTER — TREATMENT (OUTPATIENT)
Dept: PHYSICAL THERAPY | Facility: CLINIC | Age: 81
End: 2021-07-02

## 2021-07-02 DIAGNOSIS — S52.532A CLOSED COLLES' FRACTURE OF LEFT RADIUS, INITIAL ENCOUNTER: Primary | ICD-10-CM

## 2021-07-02 DIAGNOSIS — Z73.6 LIMITATION OF ACTIVITIES DUE TO MUSCULOSKELETAL DISORDER: ICD-10-CM

## 2021-07-02 DIAGNOSIS — Z96.642 STATUS POST TOTAL HIP REPLACEMENT, LEFT: Primary | ICD-10-CM

## 2021-07-02 DIAGNOSIS — M79.9 LIMITATION OF ACTIVITIES DUE TO MUSCULOSKELETAL DISORDER: ICD-10-CM

## 2021-07-02 PROCEDURE — 97530 THERAPEUTIC ACTIVITIES: CPT | Performed by: PHYSICAL THERAPIST

## 2021-07-02 PROCEDURE — 97110 THERAPEUTIC EXERCISES: CPT | Performed by: PHYSICAL THERAPIST

## 2021-07-02 PROCEDURE — 97140 MANUAL THERAPY 1/> REGIONS: CPT | Performed by: OCCUPATIONAL THERAPIST

## 2021-07-02 PROCEDURE — 97110 THERAPEUTIC EXERCISES: CPT | Performed by: OCCUPATIONAL THERAPIST

## 2021-07-02 NOTE — PROGRESS NOTES
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  POC 20, 2x/wk , exp 21    L KISHA (PT)   colles fracture (OT)           Subjective :  Pain 3/10 L lateral hip and groin.    Objective   See Exercise, Manual, and Modality Logs for complete treatment.     EDUCATION:      Assessment/Plan:  No hip abduction L hip due to pain as pt. Refused.  She is very uncomfortable lying on R side and back on mat.  She does not like it and prefers to perform there ex in sitting or standing. Transitional movements slow and painful.    Progress per Plan of Care         Timed:                                                                          Manual Therapy:                 mins  04594;                      Therapeutic Exercise:    20     mins  57999;     Neuromuscular Estelle:        mins  87471;    Therapeutic Activity:      10     mins  80226;     Gait Training:                      mins  67611;     Ultrasound:                          mins  09669;    Ionto:                                   mins   24816  Self Care:                            mins   49861     Un-Timed:  Electrical Stimulation:         mins  08393 ( );  Dry Needling                       mins self-pay  Traction                               mins 49382  Re-Eval                               mins  67140     Timed Treatment:   30   mins   Total Treatment:     30   mins      Eliza Zamora PTA  Physical Therapist Assistant

## 2021-07-02 NOTE — PROGRESS NOTES
OccupationalTherapy Daily Progress Note        Patient: Keya Rogers   : 1940  Diagnosis/ICD-10 Code:  Closed Colles' fracture of left radius, initial encounter [S52.532A]  Referring practitioner: GAYLE Amaya  Date of Initial Visit: Type: THERAPY  Noted: 2021  Today's Date: 2021  Patient seen for 6 sessions                 Subjective pt reporting that she feels she is using her hand a bit more     Objective   Pt is able to bring fingertips to palm at the end of session   It is doubtful that the pt exercises or uses her hand in ADL and bilat integration as she is so stiff and painful at the start of each session  There are some arthritic changes as well   Discussed /encouraged pt to do more with her hand     She does report pain across mcp/pip during PROM   It is also noted and discussed with pt that when she is gripping objects she uses fingertips  Not across mcp   Pain during tx -6/10    End of tx  3-4/10  See Exercise, Manual, and Modality Logs for complete treatment.       Assessment/Plan  Continue to perform outlined program and tx focus to increase range of digits and complete  reduce pain   Progress per Plan of Care           Timed Codes        Manual Therapy:  15       mins  61069;    Therapeutic Exercise:    15     mins  94662;     Neuromuscular Estelle:        mins  07104;    Therapeutic Activity:          mins  28005;      Ultrasound:          mins  71729;    Community/ work         mins  82876  Self Care/ Nellie         mins  95284  Sensory Re-Int.                  mins   67865  Cognitve Re-train         mins  82050     Un-timed Codes  Electrical Stimulation:         mins 9 90876 ( );      Timed Treatment:  30    mins   Total Treatment:     30   mins    Stacey Lepe OT  Occupational Therapist

## 2021-07-09 ENCOUNTER — TREATMENT (OUTPATIENT)
Dept: PHYSICAL THERAPY | Facility: CLINIC | Age: 81
End: 2021-07-09

## 2021-07-09 DIAGNOSIS — R29.898 WEAKNESS OF LEFT HIP: ICD-10-CM

## 2021-07-09 DIAGNOSIS — S52.532A CLOSED COLLES' FRACTURE OF LEFT RADIUS, INITIAL ENCOUNTER: Primary | ICD-10-CM

## 2021-07-09 DIAGNOSIS — Z96.642 STATUS POST TOTAL HIP REPLACEMENT, LEFT: Primary | ICD-10-CM

## 2021-07-09 DIAGNOSIS — S66.912D STRAIN OF LEFT HAND, SUBSEQUENT ENCOUNTER: ICD-10-CM

## 2021-07-09 DIAGNOSIS — R26.9 GAIT DISTURBANCE: ICD-10-CM

## 2021-07-09 PROCEDURE — 97110 THERAPEUTIC EXERCISES: CPT | Performed by: PHYSICAL THERAPIST

## 2021-07-09 PROCEDURE — 97110 THERAPEUTIC EXERCISES: CPT | Performed by: OCCUPATIONAL THERAPIST

## 2021-07-09 PROCEDURE — 97140 MANUAL THERAPY 1/> REGIONS: CPT | Performed by: OCCUPATIONAL THERAPIST

## 2021-07-09 PROCEDURE — 97530 THERAPEUTIC ACTIVITIES: CPT | Performed by: OCCUPATIONAL THERAPIST

## 2021-07-09 PROCEDURE — 97530 THERAPEUTIC ACTIVITIES: CPT | Performed by: PHYSICAL THERAPIST

## 2021-07-09 PROCEDURE — 97140 MANUAL THERAPY 1/> REGIONS: CPT | Performed by: PHYSICAL THERAPIST

## 2021-07-09 NOTE — PROGRESS NOTES
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  POC 20, 2x/wk , exp 21    L KISHA (PT)   colles fracture (OT)           Subjective : Doing OK, going through stuff at home to downsize. Discomfort in groin and lateral hip noted. Pt has not visualized or touched her scar. Reports that she just recently has been tolerating water running over it.      Objective     See Exercise, Manual, and Modality Logs for complete treatment. STM x 10 mins to L Lateral hip (glutes, scar) and quads.      EDUCATION: Instructed and encouraged to perform self massage over scar and quads.     Assessment/Plan:  Pt is very sensitive to light and firm touch along her surgical scar and distal quads, some minor adhesions noted. Performed STM within pts tolerance and provided education to perform massage at home to further reduce discomfort. Also discussed use of heat and ice as needed for muscle relaxation. Otherwise has good tolerance to standing and seated ex's.     Progress per Plan of Care         Timed:                                                                          Manual Therapy:           10      mins  52417;                      Therapeutic Exercise:    18     mins  02851;     Neuromuscular Estelle:        mins  41795;    Therapeutic Activity:      10     mins  22313;     Gait Training:                      mins  54156;     Ultrasound:                          mins  35402;    Ionto:                                   mins   06490  Self Care:                            mins   65903     Un-Timed:  Electrical Stimulation:         mins  09541 ( );  Dry Needling                       mins self-pay  Traction                               mins 22891  Re-Eval                                mins  36754     Timed Treatment:   36   mins   Total Treatment:     36   mins      Karen Lord, ALLISON  Physical Therapist Assistant

## 2021-07-09 NOTE — PROGRESS NOTES
OccupationalTherapy Daily Progress Note        Patient: Keya Rogers   : 1940  Diagnosis/ICD-10 Code:  Closed Colles' fracture of left radius, initial encounter [S52.532A]  Referring practitioner: GAYLE Amaya  Date of Initial Visit: Type: THERAPY  Noted: 2021  Today's Date: 2021  Patient seen for 7 sessions        Subjective pt arriving late     Objective   Pt continues to have difficulty bringing fingertips to palm   Hand /skin less shinny   During PROM pt continues to report pain at 4-510 with ranging   Able to get fingertips to palm s/p tx   Sit to stand using hand to push up   See Exercise, Manual, and Modality Logs for complete treatment.       Assessment/Plan  Pt states that she feels she is doing more at home but in observing pt does not use hand to get up out of chair     Progress per Plan of Care           Timed Codes        Manual Therapy:    15    mins  63099;    Therapeutic Exercise:     15    mins  48378;     Neuromuscular Estelle:        mins  51983;    Therapeutic Activity:     8     mins  29528;      Ultrasound:          mins  47778;    Community/ work         mins  03615  Self Care/ Nellie         mins  33641  Sensory Re-Int.                  mins   30889  Cognitve Re-train         mins  77703     Un-timed Codes  Electrical Stimulation:         mins 9 74726 ( );      Timed Treatment:  38    mins   Total Treatment:     38   mins    Stacey Lepe OT  Occupational Therapist

## 2021-07-12 ENCOUNTER — TREATMENT (OUTPATIENT)
Dept: PHYSICAL THERAPY | Facility: CLINIC | Age: 81
End: 2021-07-12

## 2021-07-12 DIAGNOSIS — S52.532A CLOSED COLLES' FRACTURE OF LEFT RADIUS, INITIAL ENCOUNTER: Primary | ICD-10-CM

## 2021-07-12 DIAGNOSIS — S66.912D STRAIN OF LEFT HAND, SUBSEQUENT ENCOUNTER: ICD-10-CM

## 2021-07-12 PROCEDURE — 97140 MANUAL THERAPY 1/> REGIONS: CPT | Performed by: OCCUPATIONAL THERAPIST

## 2021-07-12 PROCEDURE — 97110 THERAPEUTIC EXERCISES: CPT | Performed by: OCCUPATIONAL THERAPIST

## 2021-07-12 NOTE — PROGRESS NOTES
OccupationalTherapy Daily Progress Note        Patient: Keya Rogers   : 1940  Diagnosis/ICD-10 Code:  Closed Colles' fracture of left radius, initial encounter [S52.532A]  Referring practitioner: GAYLE Amaya  Date of Initial Visit: Type: THERAPY  Noted: 2021  Today's Date: 2021  Patient seen for 8 sessions               Subjective pt stating that she feels that the hand is doing better and she is using it to get up out of chairs     Objective   Demonstrating supination WFL  S/p tx able to bring fingertips to palm   Extensor tendons remain tight which continues to limit full flexion   Pt during therapeutic activity needs to be cued as she does not make a complete fist   Uses pip cradle when gripping      See Exercise, Manual, and Modality Logs for complete treatment.       Assessment/Plan  Continue to encourage complete use of  when doing daily activities   Also continue to encourage wt bearing   Progress per Plan of Care           Timed Codes        Manual Therapy:    15     mins  91025;    Therapeutic Exercise:     15    mins  66901;     Neuromuscular Estelle:        mins  83139;    Therapeutic Activity:          mins  57373;      Ultrasound:          mins  57228;    Community/ work         mins  06919  Self Care/ Nellie         mins  36955  Sensory Re-Int.                  mins   48258  Cognitve Re-train         mins  20332     Un-timed Codes  Electrical Stimulation:         mins 9 53652 ( );      Timed Treatment:  30    mins   Total Treatment:     30   mins    Stacey Lepe OT  Occupational Therapist

## 2021-07-16 ENCOUNTER — TREATMENT (OUTPATIENT)
Dept: PHYSICAL THERAPY | Facility: CLINIC | Age: 81
End: 2021-07-16

## 2021-07-16 DIAGNOSIS — S52.532A CLOSED COLLES' FRACTURE OF LEFT RADIUS, INITIAL ENCOUNTER: Primary | ICD-10-CM

## 2021-07-16 PROCEDURE — 97110 THERAPEUTIC EXERCISES: CPT | Performed by: OCCUPATIONAL THERAPIST

## 2021-07-16 PROCEDURE — 97140 MANUAL THERAPY 1/> REGIONS: CPT | Performed by: OCCUPATIONAL THERAPIST

## 2021-07-16 NOTE — PROGRESS NOTES
OccupationalTherapy Daily Progress Note        Patient: Keya Rogers   : 1940  Diagnosis/ICD-10 Code:  Closed Colles' fracture of left radius, initial encounter [S52.532A]  Referring practitioner: GAYLE Amaya  Date of Initial Visit: Type: THERAPY  Noted: 2021  Today's Date: 2021  Patient seen for 9 sessions            Subjective  Pt reporting that she is able to reach out and grasp car door and pull closed     Objective   Wrist ext  60   Flex  50   right 35   Left 8 psi   9 hole peg   Right 24  MMT is 3/5  Thumb opposition is complete     See Exercise, Manual, and Modality Logs for complete treatment.       Assessment/Plan  Pt is demonstrating improvement in all functional areas    Continue with range and strengthening     Progress per Plan of Care           Timed Codes        Manual Therapy:    15     mins  27165;    Therapeutic Exercise:    15     mins  48414;     Neuromuscular Estelle:        mins  65894;    Therapeutic Activity:          mins  72033;      Ultrasound:          mins  87054;    Community/ work         mins  40927  Self Care/ Nellie         mins  92196  Sensory Re-Int.                  mins   09713  Cognitve Re-train         mins  50634     Un-timed Codes  Electrical Stimulation:         mins 9 59217 ( );      Timed Treatment: 30    mins   Total Treatment:     30   mins    Stacey Lepe OT  Occupational Therapist

## 2021-07-19 ENCOUNTER — TREATMENT (OUTPATIENT)
Dept: PHYSICAL THERAPY | Facility: CLINIC | Age: 81
End: 2021-07-19

## 2021-07-19 DIAGNOSIS — R26.9 GAIT DISTURBANCE: ICD-10-CM

## 2021-07-19 DIAGNOSIS — R29.898 WEAKNESS OF LEFT HIP: ICD-10-CM

## 2021-07-19 DIAGNOSIS — S52.532A CLOSED COLLES' FRACTURE OF LEFT RADIUS, INITIAL ENCOUNTER: ICD-10-CM

## 2021-07-19 DIAGNOSIS — Z96.642 STATUS POST TOTAL HIP REPLACEMENT, LEFT: Primary | ICD-10-CM

## 2021-07-19 DIAGNOSIS — M25.652 IMPAIRED RANGE OF MOTION OF LEFT HIP: Primary | ICD-10-CM

## 2021-07-19 PROCEDURE — 97140 MANUAL THERAPY 1/> REGIONS: CPT | Performed by: OCCUPATIONAL THERAPIST

## 2021-07-19 PROCEDURE — 97110 THERAPEUTIC EXERCISES: CPT | Performed by: PHYSICAL THERAPIST

## 2021-07-19 PROCEDURE — 97110 THERAPEUTIC EXERCISES: CPT | Performed by: OCCUPATIONAL THERAPIST

## 2021-07-19 PROCEDURE — 97530 THERAPEUTIC ACTIVITIES: CPT | Performed by: PHYSICAL THERAPIST

## 2021-07-19 NOTE — PROGRESS NOTES
"Physical Therapy 30 day Progress Note    Patient: Keya Rogers  : 1940  Referring practitioner: GAYLE Amaya  Today's Date: 2021    VISIT#: 8    Subjective   Keya Rogers reports: Says she is doing pretty well, feels that she is walking better. Says her pain is 3/10 today. Says she is about 75% improved since starting therapy. Still carries cane some but doesn't need it much, doesn't use at home unless on stairs      Objective     30 second sit to stand: 9 reps  TUG: 10 seconds  GAIT: no AD, slight decreased WS to LLE, mild trendelenburg left hip - much improved since last assessment    See Exercise, Manual, and Modality Logs for complete treatment.     Patient Education: revised HEP and provided handout. Educated on how to best pick item up off floor.     Assessment & Plan     Assessment  Assessment details: Brie is making very good progress with therapy. She has met all STG and making good progress toward LTG. Her gait mechanics have significantly improved, however she is still showing slight impairment of mechanics. Her functional assessments also show good improvement (30 sec sit to stand and TUG) indicating improved functional strength. She continues to demonstrate hip abduction and extension weakness and decreased hip ROM which results in gait impairments and difficulty picking items up off the floor. Requires continued skilled PT to address remaining impairments.     Goals  Plan Goals: 8 visits     1)  Patient to complete AROM L hip to WFLs and as per guidelines MET     2)  Sit <> stand from chair/bed I wo use of UE - MET     3)  Sit <> supine I with smooth transitions - MET     4)  Gait within house setting wo assistive device - MET    LTGs:  DC     1)  Patient to report < 25% sleep interruptions due to the L hip - MET     2)  MMT L hip > 4/5;  Knee/ankle 5/5     3)  30\" STS > 10 reps - NOT MET (9 reps)     4)  TUG score =/< 14 seconds - MET (10 seconds)     5)  [place] in " final home ex program - NOT MET     6)  Normal gait pattern wo assistive device. - NOT MET        Progress per Plan of Care            Timed:         Manual Therapy:         mins  36651;     Therapeutic Exercise:    12     mins  64126;     Neuromuscular Estelle:        mins  85740;    Therapeutic Activity:     20     mins  04954;     Gait Training:           mins  57849;     Ultrasound:          mins  62448;    Ionto:                                   mins   28775  Self Care:                            mins   87171    Un-Timed:  Electrical Stimulation:         mins  16471 ( );  Dry Needling          mins self-pay  Traction          mins 30668  Re-Eval                               mins  90913    Timed Treatment:   32   mins   Total Treatment:     32   mins    Barbra Fry, ESTRELLITA  Physical Therapist

## 2021-07-19 NOTE — PROGRESS NOTES
OccupationalTherapy Daily Progress Note        Patient: Keya Rogers   : 1940  Diagnosis/ICD-10 Code:  Impaired range of motion of left hip [M25.652]  Referring practitioner: GAYLE Amaya  Date of Initial Visit: Type: THERAPY  Noted: 2021  Today's Date: 2021  Patient seen for 10 sessions               Subjective pt stating that she feels she is making improvement    Pt demonstrating less painful remarks and visual grimacing during session      Objective   mcp flexion is WFL   Pip flexion is improving thru session demonstrated improvement as was able to bring fingertips to palm   Opposition is complete   Wrist strength remains at below normal   See Exercise, Manual, and Modality Logs for complete treatment.       Assessment/Plan  Continue outlined program for range and strength   Progress per Plan of Care           Timed Codes        Manual Therapy:  15      mins  16422;    Therapeutic Exercise:     15    mins  31900;     Neuromuscular Estelle:        mins  58174;    Therapeutic Activity:          mins  55213;      Ultrasound:          mins  21479;    Community/ work         mins  17471  Self Care/ Nellie         mins  58929  Sensory Re-Int.                  mins   14597  Cognitve Re-train         mins  37061     Un-timed Codes  Electrical Stimulation:         mins 9 45537 ( );      Timed Treatment:  30    mins   Total Treatment:     30   mins    Stacey Lepe OT  Occupational Therapist

## 2021-07-23 ENCOUNTER — TREATMENT (OUTPATIENT)
Dept: PHYSICAL THERAPY | Facility: CLINIC | Age: 81
End: 2021-07-23

## 2021-07-23 DIAGNOSIS — R26.9 GAIT DISTURBANCE: ICD-10-CM

## 2021-07-23 DIAGNOSIS — Z73.6 LIMITATION OF ACTIVITIES DUE TO MUSCULOSKELETAL DISORDER: Primary | ICD-10-CM

## 2021-07-23 DIAGNOSIS — Z96.642 STATUS POST TOTAL HIP REPLACEMENT, LEFT: Primary | ICD-10-CM

## 2021-07-23 DIAGNOSIS — R29.898 WEAKNESS OF LEFT HIP: ICD-10-CM

## 2021-07-23 DIAGNOSIS — M79.9 LIMITATION OF ACTIVITIES DUE TO MUSCULOSKELETAL DISORDER: Primary | ICD-10-CM

## 2021-07-23 DIAGNOSIS — S52.532A CLOSED COLLES' FRACTURE OF LEFT RADIUS, INITIAL ENCOUNTER: ICD-10-CM

## 2021-07-23 PROCEDURE — 97110 THERAPEUTIC EXERCISES: CPT | Performed by: PHYSICAL THERAPIST

## 2021-07-23 PROCEDURE — 97530 THERAPEUTIC ACTIVITIES: CPT | Performed by: OCCUPATIONAL THERAPIST

## 2021-07-23 PROCEDURE — 97140 MANUAL THERAPY 1/> REGIONS: CPT | Performed by: OCCUPATIONAL THERAPIST

## 2021-07-23 PROCEDURE — 97110 THERAPEUTIC EXERCISES: CPT | Performed by: OCCUPATIONAL THERAPIST

## 2021-07-23 PROCEDURE — 97530 THERAPEUTIC ACTIVITIES: CPT | Performed by: PHYSICAL THERAPIST

## 2021-07-23 NOTE — PROGRESS NOTES
OccupationalTherapy Daily Progress Note        Patient: Keya Rogers   : 1940  Diagnosis/ICD-10 Code:  Limitation of activities due to musculoskeletal disorder [Z73.6, M79.9]  Referring practitioner: GAYLE Amaya  Date of Initial Visit: Type: THERAPY  Noted: 2021  Today's Date: 2021  Patient seen for 11 sessions               Subjective  It doesn't seem to be hurting as much 2-3/10    Objective   Less painful   Requiring less PROM to achieve mcp pip flexion   Improved functional  and prehension with daily and simulated ADL   Improved in endurance in gripping  Continues to need to be reminded to  fully and not compensate with finger tip    See Exercise, Manual, and Modality Logs for complete treatment.       Assessment/Plan  Continue outlined program adding more simulated tasks and graded exercise   Progress per Plan of Care           Timed Codes        Manual Therapy:    15     mins  35913;    Therapeutic Exercise:    30    mins  34876;     Neuromuscular Estelle:    mins  10678;    Therapeutic Activity:    15      mins  09827;      Ultrasound:          mins  19365;    Community/ work         mins  87493  Self Care/ Nellie         mins  71870  Sensory Re-Int.                  mins   71453  Cognitve Re-train         mins  72861     Un-timed Codes  Electrical Stimulation:         mins 9 37129 ( );      Timed Treatment:   30   mins   Total Treatment:     30   mins    Stacey Lepe OT  Occupational Therapist

## 2021-07-23 NOTE — PROGRESS NOTES
Physical Therapy Daily Progress Note    VISIT#: 9    Subjective   Keya Rogers reports that her hip is feeling about the same. She is still having pain.   Pain Ratin    Objective     See Exercise, Manual, and Modality Logs for complete treatment.     Patient Education: activity tolerance    Assessment/Plan   Pt had mild anterior hip pain after performing several standing activities. Pt performed gentle hip flexor stretch in standing that eased the pain. Pt required occasional rest breaks between activities today.      Progress per Plan of Care and Progress strengthening /stabilization /functional activity          Timed:         Manual Therapy:         mins  74756;     Therapeutic Exercise:    14     mins  72159;     Neuromuscular Estelle:        mins  20183;    Therapeutic Activity:     16     mins  51986;     Gait Training:           mins  72862;     Ultrasound:          mins  83288;    Ionto                                   mins   02619  Self Care                            mins   67657  Canalith Repos                   mins  45903    Un-Timed:  Electrical Stimulation:         mins  31366 ( );  Dry Needling          mins self-pay  Traction          mins 52591  Low Eval          Mins  64763  Mod Eval          Mins  49913  High Eval                            Mins  79841  Re-Eval                               mins  67789    Timed Treatment:   30   mins   Total Treatment:     30   mins    Christine Dial PTA  Physical Therapist Assistant  [place] License # [id number]

## 2021-07-30 ENCOUNTER — TREATMENT (OUTPATIENT)
Dept: PHYSICAL THERAPY | Facility: CLINIC | Age: 81
End: 2021-07-30

## 2021-07-30 DIAGNOSIS — S66.912D STRAIN OF LEFT HAND, SUBSEQUENT ENCOUNTER: Primary | ICD-10-CM

## 2021-07-30 PROCEDURE — 97110 THERAPEUTIC EXERCISES: CPT | Performed by: OCCUPATIONAL THERAPIST

## 2021-07-30 PROCEDURE — 97140 MANUAL THERAPY 1/> REGIONS: CPT | Performed by: OCCUPATIONAL THERAPIST

## 2021-07-30 PROCEDURE — 97530 THERAPEUTIC ACTIVITIES: CPT | Performed by: OCCUPATIONAL THERAPIST

## 2021-07-30 NOTE — PROGRESS NOTES
OccupationalTherapy Daily UPDATE NOTE        Patient: Keya Rogers   : 1940  Diagnosis/ICD-10 Code:  Strain of left hand, subsequent encounter [S66.912D]  Referring practitioner: GAYLE Amaya  Date of Initial Visit: Type: THERAPY  Noted: 2021  Today's Date: 2021  Patient seen for 12 sessions               Subjective  Pt states that she continues with difficulty in carrying bucket of water, opening refrigerator dood       Objective   Pt continues to use claw  and not complete MCP flex    Wrist ext is 45   flex  55 opposition is complete  MMT is 3t/5  Right  is 45   Left  is 15 up from 10 psi  9 hole peg right is 21 sec    Left is 22 sec    Radial deviation 18    Ulnar deviation 30  Supination pronation is WFL   Shoulder motion is WFL in all directions   Integrative use of her left hand into daily activities is improving    She remains somewhat reluctant to automatically use the left hand            See Exercise, Manual, and Modality Logs for complete treatment.       Assessment/Plan  Impairments: abnormal coordination, abnormal or restricted ROM, activity intolerance, impaired physical strength, pain with function and weight-bearing intolerance  Assessment details: Pt reports that she was  not using  her left hand for ADL:   She has only started doing smple tasks with L hand due to pain   Discussed with pt that due to immobilization her tendons have shortened and she needs to move and stretch   She has the above limitations and functional impairments due to lack of using hand and tendon shortening and limitations in range      Barriers to therapy: pain   although improving less pain with function at -3/10  Prognosis: good  Functional Limitations: carrying objects, lifting, pushing, uncomfortable because of pain,    unable to perform repetitive tasks  Goals  Plan Goals: stg    HEP  stg  Improve wrist extension  To 60 -65 And flexion to 60-65  Partially MET  stg    Radial and ulnar deviation improve by 5-10 degree    MET     ltg   Improve wrist strength to 3/5   MET  ltg  Improve  strength to equal that of right   Partially MET  ltg   Improve coordination and manipulation skills   MET  ltg  Improve functional and integrative use of Left hand   Pt is indicated for skilled OT    Progress per Plan of Care  Plan  Planned therapy interventions: ADL retraining, balance/weight-bearing training, body mechanics training, fine motor coordination training, flexibility, functional ROM exercises, home exercise program, manual therapy, joint mobilization, neuromuscular re-education, strengthening, stretching and therapeutic activities  Frequency: 2x week  Treatment plan discussed with: patient and caregiver  Plan details: 15             Timed Codes        Manual Therapy:    30     mins  05530;    Therapeutic Exercise:   15      mins  53178;     Neuromuscular Estelle:        mins  53973;    Therapeutic Activity:    15      mins  22990;      Ultrasound:          mins  83572;    Community/ work         mins  11374  Self Care/ Nellie         mins  58805  Sensory Re-Int.                  mins   01127  Cognitve Re-train         mins  42447     Un-timed Codes  Electrical Stimulation:         mins 9 94530 ( );      Timed Treatment:     60 mins   Total Treatment:    60   mins    Stacey Lepe OT  Occupational Therapist

## 2021-08-02 ENCOUNTER — TREATMENT (OUTPATIENT)
Dept: PHYSICAL THERAPY | Facility: CLINIC | Age: 81
End: 2021-08-02

## 2021-08-02 DIAGNOSIS — M25.652 IMPAIRED RANGE OF MOTION OF LEFT HIP: ICD-10-CM

## 2021-08-02 DIAGNOSIS — S52.532A CLOSED COLLES' FRACTURE OF LEFT RADIUS, INITIAL ENCOUNTER: Primary | ICD-10-CM

## 2021-08-02 PROCEDURE — 97140 MANUAL THERAPY 1/> REGIONS: CPT | Performed by: OCCUPATIONAL THERAPIST

## 2021-08-02 PROCEDURE — 97530 THERAPEUTIC ACTIVITIES: CPT | Performed by: OCCUPATIONAL THERAPIST

## 2021-08-02 PROCEDURE — 97110 THERAPEUTIC EXERCISES: CPT | Performed by: OCCUPATIONAL THERAPIST

## 2021-08-02 NOTE — PROGRESS NOTES
OccupationalTherapy Daily Progress Note        Patient: Keya Rogers   : 1940  Diagnosis/ICD-10 Code:  Closed Colles' fracture of left radius, initial encounter [S52.532A]  Referring practitioner: GAYLE Amaya  Date of Initial Visit: Type: THERAPY  Noted: 2021  Today's Date: 2021  Patient seen for 13 sessions               Subjective   Pt stating that she will be missing a few treatments as she is traveling to [place] to see her children     Objective     Pt continues to use claw  and not complete MCP flex    Wrist ext is 45   flex  55 opposition is complete  MMT is 3t/5  Right  is 45   Left  is 15 up from 10 psi  Supination pronation is WFL   Shoulder motion is WFL in all directions           Assessment/Plan  Continue to focus on improving  to complete hook and extensor tendon lengthening   Progress per Plan of Care           Timed Codes        Manual Therapy:    30     mins  18039;    Therapeutic Exercise:    15     mins  06764;     Neuromuscular Estelle:        mins  17880;    Therapeutic Activity:    15      mins  65543;      Ultrasound:          mins  74258;    Community/ work         mins  14096  Self Care/ Nellie         mins  42527  Sensory Re-Int.                  mins   14158  Cognitve Re-train         mins  18603     Un-timed Codes  Electrical Stimulation:         mins 9 56192 ( );      Timed Treatment:  60    mins   Total Treatment:     60   mins    Stacey Lepe OT  Occupational Therapist

## 2021-10-02 DIAGNOSIS — F41.9 ANXIETY: ICD-10-CM

## 2021-10-02 RX ORDER — ALPRAZOLAM 1 MG/1
0.5 TABLET ORAL DAILY PRN
Qty: 30 TABLET | Refills: 0 | Status: SHIPPED | OUTPATIENT
Start: 2021-10-02 | End: 2022-09-22 | Stop reason: SDUPTHER

## 2022-02-01 NOTE — PROGRESS NOTES
Physical Therapy Daily Progress Note      Patient: Keya Rogers   : 1940  Diagnosis/ICD-10 Code:  Degeneration of lumbar intervertebral disc [M51.36]   Problems Addressed this Visit     None      Visit Diagnoses     Degeneration of lumbar intervertebral disc    -  Primary    Chronic bilateral low back pain, unspecified whether sciatica present        Pain, neck          Diagnoses       Codes Comments    Degeneration of lumbar intervertebral disc    -  Primary ICD-10-CM: M51.36  ICD-9-CM: 722.52     Chronic bilateral low back pain, unspecified whether sciatica present     ICD-10-CM: M54.5, G89.29  ICD-9-CM: 724.2, 338.29     Pain, neck     ICD-10-CM: M54.2  ICD-9-CM: 723.1          Referring practitioner: Amie Sharma*  Date of Initial Visit: Type: THERAPY  Noted: 2020  Today's Date: 10/13/2020    VISIT#: 5    Subjective Patient reports her neck feels fine today.  Patient states her back is feeling a little better and has been trying to walk more at home.       Objective     See Exercise, Manual, and Modality Logs for complete treatment.     Assessment/Plan Patient requesting not to lie on the mat due to firmness so patient unable to perform cervical or lumbar distraction this date despite favorable response in previous sessions. She responded well to exercise this date with no complaints of pain with activity.  Progressed stair training with addition of step ups with cues required to minimize knee valgus and for controlled descent.      Progress per Plan of Care and Progress strengthening /stabilization /functional activity         Timed:         Manual Therapy:         mins  59652;     Therapeutic Exercise:    30     mins  80997;     Neuromuscular Estelle:        mins  84776;    Therapeutic Activity:     15     mins  05616;     Gait Training:           mins  46861;     Ultrasound:          mins  12935;    Ionto                                   mins   21515  Self Care                   Home           mins   37895    Un-Timed:  Electrical Stimulation:         mins  53832 ( );  Dry Needling          mins self-pay  Traction          mins 79267  Low Eval          Mins  41828  Mod Eval          Mins  09746  High Eval                            Mins  12865  Re-Eval                               mins  36827    Timed Treatment:   45   mins   Total Treatment:     60   mins    Lolis Celestin, PT  Physical Therapist

## 2022-02-16 ENCOUNTER — DOCUMENTATION (OUTPATIENT)
Dept: PHYSICAL THERAPY | Facility: CLINIC | Age: 82
End: 2022-02-16

## 2022-02-16 NOTE — PROGRESS NOTES
Discharge Summary  Discharge Summary from Physical/Occupational Therapy Report    Patient: Keya Rogers   : 1940  Today's Date: 2022    Patient seen for 9 visits.  Dates of Service: 6/10/21 - 21      Goals: Partially Met    Discharge Plan: Continue with current home exercise program as instructed  Patient to return to referring/providing physician      Thank you for this referral to [facility].    SIGNATURE: Barbra Fry PT

## 2022-05-23 ENCOUNTER — TELEPHONE (OUTPATIENT)
Dept: FAMILY MEDICINE CLINIC | Facility: CLINIC | Age: 82
End: 2022-05-23

## 2022-05-23 NOTE — TELEPHONE ENCOUNTER
PATIENT HAS CALLED IN TO SCHEDULE AN APPOINTMENT TO BE SEEN ON 05/25/22 BUT WANTS TO KNOW WHAT SHE CAN DO FOR HER HIP PAIN/LEG PAIN UNTIL SHE IS SEEN.  SHE SAYS SHE HAS BEEN TAKING PAIN MEDS AND USING A HEATING PAD ON IT TO HELP    PATIENT CALL BACK  [phone]

## 2022-05-25 ENCOUNTER — OFFICE VISIT (OUTPATIENT)
Dept: FAMILY MEDICINE CLINIC | Facility: CLINIC | Age: 82
End: 2022-05-25

## 2022-05-25 VITALS
HEART RATE: 89 BPM | HEIGHT: 61 IN | WEIGHT: 152.2 LBS | DIASTOLIC BLOOD PRESSURE: 74 MMHG | TEMPERATURE: 97.8 F | SYSTOLIC BLOOD PRESSURE: 130 MMHG | BODY MASS INDEX: 28.74 KG/M2 | OXYGEN SATURATION: 99 % | RESPIRATION RATE: 18 BRPM

## 2022-05-25 DIAGNOSIS — M54.17 LUMBOSACRAL RADICULOPATHY AT L4: Primary | ICD-10-CM

## 2022-05-25 DIAGNOSIS — E66.3 OVERWEIGHT (BMI 25.0-29.9): ICD-10-CM

## 2022-05-25 PROCEDURE — 99213 OFFICE O/P EST LOW 20 MIN: CPT | Performed by: FAMILY MEDICINE

## 2022-05-25 PROCEDURE — 96372 THER/PROPH/DIAG INJ SC/IM: CPT | Performed by: FAMILY MEDICINE

## 2022-05-25 RX ORDER — DEXAMETHASONE SODIUM PHOSPHATE 10 MG/ML
10 INJECTION INTRAMUSCULAR; INTRAVENOUS ONCE
Status: COMPLETED | OUTPATIENT
Start: 2022-05-25 | End: 2022-05-25

## 2022-05-25 RX ORDER — PREDNISONE 20 MG/1
20 TABLET ORAL DAILY
Qty: 7 TABLET | Refills: 0 | Status: SHIPPED | OUTPATIENT
Start: 2022-05-25 | End: 2022-06-01

## 2022-05-25 RX ORDER — TRAMADOL HYDROCHLORIDE 50 MG/1
50 TABLET ORAL EVERY 8 HOURS PRN
Qty: 15 TABLET | Refills: 0 | Status: SHIPPED | OUTPATIENT
Start: 2022-05-25

## 2022-05-25 RX ORDER — DEXAMETHASONE SODIUM PHOSPHATE 10 MG/ML
5 INJECTION INTRAMUSCULAR; INTRAVENOUS ONCE
Status: DISCONTINUED | OUTPATIENT
Start: 2022-05-25 | End: 2022-05-25

## 2022-05-25 RX ADMIN — DEXAMETHASONE SODIUM PHOSPHATE 10 MG: 10 INJECTION INTRAMUSCULAR; INTRAVENOUS at 16:59

## 2022-06-08 ENCOUNTER — OFFICE VISIT (OUTPATIENT)
Dept: FAMILY MEDICINE CLINIC | Facility: CLINIC | Age: 82
End: 2022-06-08

## 2022-06-08 VITALS
HEART RATE: 80 BPM | TEMPERATURE: 97.7 F | SYSTOLIC BLOOD PRESSURE: 126 MMHG | DIASTOLIC BLOOD PRESSURE: 70 MMHG | OXYGEN SATURATION: 100 % | HEIGHT: 61 IN | WEIGHT: 152 LBS | BODY MASS INDEX: 28.7 KG/M2 | RESPIRATION RATE: 18 BRPM

## 2022-06-08 DIAGNOSIS — F41.9 ANXIETY: ICD-10-CM

## 2022-06-08 DIAGNOSIS — M47.816 LUMBAR FACET ARTHROPATHY: Primary | ICD-10-CM

## 2022-06-08 DIAGNOSIS — E66.3 OVERWEIGHT (BMI 25.0-29.9): ICD-10-CM

## 2022-06-08 PROCEDURE — 99213 OFFICE O/P EST LOW 20 MIN: CPT | Performed by: FAMILY MEDICINE

## 2022-06-08 NOTE — PROGRESS NOTES
Chief Complaint   Patient presents with   • Hip Pain       Subjective   Keya Rogers is a 81 y.o. female.     Hip Pain   There was no injury mechanism. The pain is present in the right hip. The quality of the pain is described as burning (Sharp). The pain is at a severity of 4/10. The pain is mild. The pain has been intermittent since onset. Pertinent negatives include no numbness or tingling. She reports no foreign bodies present. The symptoms are aggravated by movement, palpation and weight bearing. She has tried acetaminophen, heat and rest (tramadol) for the symptoms. The treatment provided moderate relief.      Patient as seen for this issue 5/23/22 at the  and with myself 5/25/22.   She is here for recheck and reports improvement.  PT downstairs cannot see her until next month.    Patient has been seeing a psychiatrist in [place], who has recommended to her to take escitalopram to level out her mood.  Patient has not stated due to concern for side effects and time it takes for medication to be effective.    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   • Lower extremity pain, posterior 07/06/2020   • Osteoarthritis of both knees 07/06/2020   • Prolonged QT interval 07/06/2020   • Rash 07/06/2020   • Rosacea 07/06/2020   • Syncope 07/06/2020   • Essential hypertension 07/06/2020   • Skin lesion of back 07/06/2020   • History of DVT (deep vein thrombosis) 07/09/2020   • Arthritis of both hands 08/05/2020   • Osteopenia of multiple sites  08/05/2020   • Overweight (BMI 25.0-29.9) 06/08/2022     Resolved Ambulatory Problems     Diagnosis Date Noted   • Weakness 07/06/2020   • Insect bite of abdomen 07/06/2020   • Need for immunization against influenza 07/06/2020   • Need for vaccination with 13-polyvalent pneumococcal conjugate vaccine 07/06/2020   • Positive depression screening 07/06/2020   • Precordial pain 07/06/2020   • Encounter for routine adult medical exam with abnormal findings 07/06/2020   • Visit for suture removal 07/06/2020   • Screening for cardiovascular condition 07/06/2020   • Insect bite of right lower leg 07/06/2020     Past Medical History:   Diagnosis Date   • Deep vein thrombosis (HCC)        Medication List:    Current Outpatient Medications:   •  ALPRAZolam (XANAX) 1 MG tablet, Take 0.5 tablets by mouth Daily As Needed for Anxiety. Use sparingly., Disp: 30 tablet, Rfl: 0  •  Calcium Carbonate (CALCIUM 600 PO), Take  by mouth Daily., Disp: , Rfl:   •  escitalopram (LEXAPRO) 5 MG tablet, , Disp: , Rfl:   •  hydroCHLOROthiazide (HYDRODIURIL) 25 MG tablet, Take 1 tablet by mouth Daily., Disp: 90 tablet, Rfl: 2  •  lisinopril (PRINIVIL,ZESTRIL) 5 MG tablet, Take 1 tablet by mouth Daily., Disp: 90 tablet, Rfl: 2  •  metroNIDAZOLE (METROGEL) 0.75 % gel, Apply  topically to the appropriate area as directed Daily., Disp: 45 g, Rfl: 5  •  traMADol (ULTRAM) 50 MG tablet, Take 1 tablet by mouth Every 8 (Eight) Hours As Needed for Severe Pain ., Disp: 15 tablet, Rfl: 0    Allergies   Allergen Reactions   • Dimenhydrinate Other (See Comments)   • Oxycodone-Acetaminophen Nausea And Vomiting   • Oxycodone Hcl GI Intolerance   • Scopolamine Confusion       Social History     Tobacco Use   • Smoking status: Never Smoker   • Smokeless tobacco: Never Used   Substance Use Topics   • Alcohol use: Never       Review of Systems   Musculoskeletal: Positive for back pain and gait problem.   Neurological: Negative for tingling and numbness.  "  Psychiatric/Behavioral: Positive for stress. The patient is nervous/anxious.          Objective   Vitals:    06/08/22 1326   BP: 126/70   BP Location: Right arm   Patient Position: Sitting   Cuff Size: Adult   Pulse: 80   Resp: 18   Temp: 97.7 °F (36.5 °C)   TempSrc: Temporal   SpO2: 100%   Weight: 68.9 kg (152 lb)   Height: 154.9 cm (61\")     Body mass index is 28.72 kg/m².    Physical Exam  Musculoskeletal:      Lumbar back: Tenderness present. No swelling or spasms. Decreased range of motion.        Back:    Neurological:      Deep Tendon Reflexes:      Reflex Scores:       Patellar reflexes are 2+ on the right side and 2+ on the left side.  Psychiatric:         Attention and Perception: Attention normal.         Mood and Affect: Mood is anxious.         Speech: Speech normal.         Behavior: Behavior normal.         Thought Content: Thought content normal.         Cognition and Memory: Cognition normal.         XR Hip With or Without Pelvis 2 - 3 View Right    Result Date: 5/23/2022  Impression:  1. No acute pelvic finding. No acute right hip finding. 2. Normal right hip. 3. Left hip replacement. 4. Advanced L4-5 facet arthropathy.  Electronically Signed By-Belkis Lawrence MD On:5/23/2022 3:01 PM This report was finalized on [id number] by  Belkis Lawrence MD.          Assessment & Plan     Diagnoses and all orders for this visit:    1. Lumbar facet arthropathy (Primary)    2. Anxiety    3. Overweight (BMI 25.0-29.9)    Will see if [facility] can see her sooner.  Call when Tramadol refill needed.    No follow-ups on file.       Patient was given instructions and counseling regarding his/her condition or for health maintenance advice. Please see specific information pulled into the AVS if appropriate.     I wore protective equipment throughout this patient encounter to include mask. Hand hygiene was performed before donning protective equipment and after removal when leaving the room.         "

## 2022-07-06 ENCOUNTER — TREATMENT (OUTPATIENT)
Dept: PHYSICAL THERAPY | Facility: CLINIC | Age: 82
End: 2022-07-06

## 2022-07-06 DIAGNOSIS — Z96.642 HISTORY OF TOTAL HIP ARTHROPLASTY, LEFT: ICD-10-CM

## 2022-07-06 DIAGNOSIS — R26.81 GAIT INSTABILITY: ICD-10-CM

## 2022-07-06 DIAGNOSIS — M54.17 LUMBOSACRAL RADICULOPATHY: Primary | ICD-10-CM

## 2022-07-06 DIAGNOSIS — M25.551 LATERAL PAIN OF RIGHT HIP: ICD-10-CM

## 2022-07-06 DIAGNOSIS — R53.1 GENERALIZED WEAKNESS: ICD-10-CM

## 2022-07-06 PROCEDURE — 97163 PT EVAL HIGH COMPLEX 45 MIN: CPT | Performed by: PHYSICAL THERAPIST

## 2022-07-06 PROCEDURE — 97110 THERAPEUTIC EXERCISES: CPT | Performed by: PHYSICAL THERAPIST

## 2022-07-06 PROCEDURE — 97014 ELECTRIC STIMULATION THERAPY: CPT | Performed by: PHYSICAL THERAPIST

## 2022-07-06 PROCEDURE — 97535 SELF CARE MNGMENT TRAINING: CPT | Performed by: PHYSICAL THERAPIST

## 2022-07-06 NOTE — PROGRESS NOTES
Physical Therapy Initial Evaluation and Plan of Care    Patient: Keya Rogers   : 1940  Diagnosis/ICD-10 Code:  Lumbosacral radiculopathy [M54.17]  Referring practitioner: Amie Sharma*  Date of Initial Visit: 2022  Today's Date: 2022  Patient seen for 1 sessions           Subjective Questionnaire: Oswestry: 42% impairment      Subjective Evaluation    History of Present Illness  Mechanism of injury: Pt presents with insidious onset of R low back, hip, & thigh pain. She went to  on 22, was diagnosed with sciatica. She had injection on that date w/mild relief. Pain has been constant since onset, mod-severe, sharp, burning. She had steroid injection in  then again in her PCP office and was prescribed 5 days of steroids & prescribed Tramadol. She takes extra strength Tylenol for arthritis every ~10 hrs or so. АННА:  In May she had been helping get ready for a yard sale and she sat on a chair for several hours. When she got up, she had a difficult time walking and the pain was severe. The next day she couldn't move bc of pain, she contacted her PCP who advised her to go to .   CLOF: uses SBQC for prolonged walking e.g. grocery shopping d/t feeling unsteady on feet and fear of falling  PLOF: intermittent back ache after excessive activity, hard work    Agg: wt bearing, mvmt, palpation  Relieving: sitting, rest    XR R hip 22: 1. No acute pelvic finding. No acute right hip finding.   2. Normal right hip.   3. Left hip replacement.   4. Advanced L4-5 facet arthropathy    PMH: osteopenia, HTN, OA, stage 3 kidney disease - see chart for details; [facility] . L wrist fx       Patient Occupation: [employer] in [place], spring to fall in [place]  Current pain ratin  At best pain rating: 3  At worst pain ratin  Quality: dull ache, burning and sharp  Relieving factors: medications, rest, support, relaxation and heat  Exacerbated by: housework, vacuuming, bending,  lifting.  Progression: improved    Social Support  Lives in: one-story house (2 steps to descend to laundry room)  Lives with: alone    Diagnostic Tests  X-ray: abnormal    Treatments  Current treatment: injection treatment, medication and physical therapy  Patient Goals  Patient goals for therapy: decreased pain, improved balance, increased strength and independence with ADLs/IADLs             Objective        Special Questions  Patient is experiencing night pain and disturbed sleep.     Additional Special Questions  Possible dec'd flow of urine since injury      Palpation   Left   Hypertonic in the erector spinae and lumbar paraspinals.     Right   Hypertonic in the erector spinae and lumbar paraspinals.     Neurological Testing     Additional Neurological Details  Occasional pain radiating down R LE, lateral to ant ankle, usually only lasts a few hrs    Active Range of Motion   Left Hip   External rotation (90/90): 5 degrees with pain    Strength/Myotome Testing     Lumbar   Left   Heel walk: normal  Toe walk: normal    Right   Heel walk: normal  Toe walk: normal    Left Hip   Planes of Motion   Flexion: 4  Extension: 3+    Right Hip   Planes of Motion   Flexion: 4  Extension: 3+    Additional Strength Details  30s STS: 8x (age norm 9-14)  Seated figure 4, R lacking 25%, L lacking 90% unable to get heel above shin    Ambulation   Weight-Bearing Status     Additional Weight-Bearing Status Details  Intermittent SBQC use d/t feeling unsteady & fear of falling    Intolerance to supine position    Observational Gait   Base of support: increased      SCT: Pt was provided with a hard copy of the HEP and instructed in use of it and all listed exercises.  Pt was instructed to cease any exercise that was painful, or that feels as though the form is incorrect.  Pt will return with any questions or difficulty at next session.  Pt acknowledged these instructions and agreed. (see flowsheet)  SCT: ed pt on risks, benefits of  estim  SCT: discussed lumbopelvic anatomy, how hip stiffness may affect C/L sxs & gait    Assessment & Plan     Assessment  Impairments: abnormal gait, abnormal muscle firing, abnormal muscle tone, abnormal or restricted ROM, activity intolerance, impaired balance, impaired physical strength, lacks appropriate home exercise program, pain with function and safety issue  Functional Limitations: carrying objects, lifting, sleeping, walking, pulling, pushing, uncomfortable because of pain, moving in bed and standing  Assessment details: Brie is an 82 yo female presenting to OP PT w/acute, moderate/severe lbp radiating down R lat LE to dorsal ankle. АННА: lifting, moving, carrying followed by prolonged sitting. She exhibits significantly hypertonic lumbar paraspinal muscles, significantly limited L hip ROM, unsteady gait, generalized weakness. She typically spends summers in [place], [employer] in [place] close to her family. S/S are consistent with lumbar radiculopathy likely exacerbated by muscular & ROM imbalances after GLF resulting in L KISHA & L wrist fx. Brie is intolerance to lying supine, has hx of skin irritation/sensitivity (may trial strip of tape in future sessions).   The patient is an appropriate candidate for physical therapy and will benefit from skilled patient intervention in order address the above impairments/limitations.  The plan of care, goals and treatment plan were discussed with the patient and the patient is agreeable to participating in patient services.  Prognosis: fair    Goals  Plan Goals: STG to be met in 4 wks:   1. Pt will report increased tolerance to walking at least 20 in 4wks to get back to PLOF.   2. Pt will report no greater than 5/10 average lbp in 4 wks for improved QOL.  3. Pt will demonstrate at least 15 deg L hip ER for normalized gait pattern.  4. Pt will report initiation of centralization (radicular sxs going no farther than mid calf (initially to ankle).  5. Pt will be safe,  independent, and compliant with HEP to optimize recovery and self management of symptoms.    LTG to be met in 12 wks:   1. Pt will report inc'd tolerance to walking at least 30 min for general fitness by DC  2. Pt will report no greater than 4/10 intermittent lbp for improved QOL.  4. Pt will demonstrate at least 30 deg L hip ER for normalized gait mechanics.   5. Pt will report centralization of sxs to reflect improved lumbar mechanics and reduced risk of nerve injury.  6. Pt will report no greater than 30% on FILOMENA (initially 42%) to reflect improved functional mobility & tolerance.  7. Pt will demonstrate at least 10 STS in 30s w/o UE support to reflect age norm and improved dynamic stability/le strength.     Plan  Therapy options: will be seen for skilled therapy services  Planned modality interventions: dry needling, cryotherapy, high voltage pulsed current (pain management), TENS, ultrasound, high voltage pulsed current (spasm management) and thermotherapy (hydrocollator packs)  Other planned modality interventions: possible taping trial - hx of adhesive allergies  Planned therapy interventions: abdominal trunk stabilization, balance/weight-bearing training, body mechanics training, flexibility, functional ROM exercises, gait training, home exercise program, IADL retraining, manual therapy, motor coordination training, neuromuscular re-education, postural training, soft tissue mobilization, spinal/joint mobilization, strengthening, stretching, therapeutic activities, ADL retraining, fine motor coordination training, joint mobilization and transfer training  Frequency: 2x week  Duration in weeks: 12  Treatment plan discussed with: patient        Timed:         Manual Therapy:         mins  31901;     Therapeutic Exercise:    10     mins  83118;     Neuromuscular Estelle:        mins  61713;    Therapeutic Activity:          mins  34757;     Gait Training:           mins  93021;     Ultrasound:          mins  00841;     Self-care  __15__ mins 83540    Un-Timed:  Electrical Stimulation:    12     mins  79169 ( );  Dry Needling          mins self-pay 97830  Traction          mins 94850  Low Eval          mins  96748  Mod Eval          mins  08646  High Eval                        20    mins  56582  Canal repositioning ___  mins  85695        Timed Treatment:   25   mins   Total Treatment:     57   mins    PT SIGNATURE: Balbina Alves PT, DPT, cert. DN  [place] license # [id number]  Electronically signed by Balbina Alves PT, 07/06/22, 7:31 AM EDT    Initial Certification  Certification Period: 7/6/2022 through 10/3/2022  I certify that the therapy services are furnished while this patient is under my care.  The services outlined above are required by this patient, and will be reviewed every 90 days.     PHYSICIAN: Amie Escobar MD    NPI: [id number]                                       DATE: ______________________________________________________________________________________________     Please sign and return via fax to [phone]. Thank you, [facility].

## 2022-07-25 ENCOUNTER — TREATMENT (OUTPATIENT)
Dept: PHYSICAL THERAPY | Facility: CLINIC | Age: 82
End: 2022-07-25

## 2022-07-25 DIAGNOSIS — M25.551 LATERAL PAIN OF RIGHT HIP: ICD-10-CM

## 2022-07-25 DIAGNOSIS — M54.17 LUMBOSACRAL RADICULOPATHY: Primary | ICD-10-CM

## 2022-07-25 PROCEDURE — 97110 THERAPEUTIC EXERCISES: CPT | Performed by: PHYSICAL THERAPIST

## 2022-07-25 PROCEDURE — 97014 ELECTRIC STIMULATION THERAPY: CPT | Performed by: PHYSICAL THERAPIST

## 2022-07-25 NOTE — PROGRESS NOTES
Physical Therapy Daily Progress Note      Patient: Keya Rogers   : 1940  Diagnosis/ICD-10 Code:  Lumbosacral radiculopathy [M54.17]  Referring practitioner: Amie Sharma*  Date of Initial Visit: 2022  Today's Date: 2022  Patient seen for 2 sessions.  POC 2x/wk x 12 weeks, exp 10/3/22        VISIT#: 2      Subjective :  She has not done HEP as has been very busy with personal and family issues. Pain this morning 6/10  Before pain medication,  Currently after medication 4/10.       Objective PATIENT LATE    See Exercise, Manual, and Modality Logs for complete treatment.       Patient Education:      Assessment/Plan:  Treatment abbreviated due to patient being late.  She is responding well to electrical stimulation with pain relief noted.       Progress per Plan of Care:  Monitor response, continue as appropriate.             Timed:         Manual Therapy:         mins  41657;     Therapeutic Exercise: 23       mins  12059;     Neuromuscular Estelle:        mins  93192;    Therapeutic Activity:          mins  70863;     Gait Training:           mins  90391;     Ultrasound:          mins  30141;    Ionto                                   mins   53443  Self Care                            mins   26248  Aquatic                               mins 58544    Un-Timed:  Electrical Stimulation:   12      mins  89286 ( );  Traction          mins 07102      Timed Treatment: 23     mins   Total Treatment:   35     mins    Eliza Zamora PTA  Physical Therapist Assistant   [place] license:  #[id number]

## 2022-08-02 ENCOUNTER — TREATMENT (OUTPATIENT)
Dept: PHYSICAL THERAPY | Facility: CLINIC | Age: 82
End: 2022-08-02

## 2022-08-02 DIAGNOSIS — M54.17 LUMBOSACRAL RADICULOPATHY: Primary | ICD-10-CM

## 2022-08-02 DIAGNOSIS — R26.81 GAIT INSTABILITY: ICD-10-CM

## 2022-08-02 DIAGNOSIS — Z96.642 HISTORY OF TOTAL HIP ARTHROPLASTY, LEFT: ICD-10-CM

## 2022-08-02 DIAGNOSIS — M25.551 LATERAL PAIN OF RIGHT HIP: ICD-10-CM

## 2022-08-02 DIAGNOSIS — R53.1 GENERALIZED WEAKNESS: ICD-10-CM

## 2022-08-02 PROCEDURE — 97110 THERAPEUTIC EXERCISES: CPT | Performed by: PHYSICAL THERAPIST

## 2022-08-02 NOTE — PROGRESS NOTES
Physical Therapy Daily Progress Note      Patient: Keya Rogers   : 1940  Diagnosis/ICD-10 Code:  Lumbosacral radiculopathy [M54.17]   Problems Addressed this Visit    None     Visit Diagnoses     Lumbosacral radiculopathy    -  Primary    Lateral pain of right hip        History of total hip arthroplasty, left        Gait instability        Generalized weakness          Diagnoses       Codes Comments    Lumbosacral radiculopathy    -  Primary ICD-10-CM: M54.17  ICD-9-CM: 724.4     Lateral pain of right hip     ICD-10-CM: M25.551  ICD-9-CM: 719.45     History of total hip arthroplasty, left     ICD-10-CM: Z96.642  ICD-9-CM: V43.64     Gait instability     ICD-10-CM: R26.81  ICD-9-CM: 781.2     Generalized weakness     ICD-10-CM: R53.1  ICD-9-CM: 780.79         Referring practitioner: Amie Sharma*  Date of Initial Visit: Type: THERAPY  Noted: 2022  Today's Date: 2022    VISIT#: 3    Subjective   Brie reports she has been really busy. 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     Objective     See Exercise, Manual, and Modality Logs for complete treatment.     Assessment/Plan  Brie was apprehensive about toe tap to edge of treadmill, though with verbal encouragement was able to complete exercise w/light UE support on TM arm. She was encouraged to comply with HEP for optimized outcomes.   Progress strengthening /stabilization /functional activity         Timed:         Manual Therapy:         mins  09729;     Therapeutic Exercise:    40     mins  08365;     Neuromuscular Estelle:        mins  73532;    Therapeutic Activity:     2     mins  55767;     Gait Training:           mins  36289;     Ultrasound:          mins  45284;    Ionto                                   mins   17797  Self Care                            mins   60893  Canalith Repos                   mins  33979    Un-Timed:  Electrical Stimulation:         mins  87614 ( );  Dry  Needling          mins 09856/34685  Traction          mins 26986  Low Eval          Mins  77187  Mod Eval          Mins  82165  High Eval                            Mins  23619  Re-Eval                               mins  89184    Timed Treatment:   42   mins   Total Treatment:     42   mins    Balbina Alves PT, DPT, cert. DN  Physical Therapist  [place] Lic # [id number]

## 2022-08-09 ENCOUNTER — TREATMENT (OUTPATIENT)
Dept: PHYSICAL THERAPY | Facility: CLINIC | Age: 82
End: 2022-08-09

## 2022-08-09 DIAGNOSIS — M25.551 LATERAL PAIN OF RIGHT HIP: ICD-10-CM

## 2022-08-09 DIAGNOSIS — R53.1 GENERALIZED WEAKNESS: ICD-10-CM

## 2022-08-09 DIAGNOSIS — M54.17 LUMBOSACRAL RADICULOPATHY: Primary | ICD-10-CM

## 2022-08-09 DIAGNOSIS — R26.81 GAIT INSTABILITY: ICD-10-CM

## 2022-08-09 DIAGNOSIS — Z96.642 HISTORY OF TOTAL HIP ARTHROPLASTY, LEFT: ICD-10-CM

## 2022-08-09 PROCEDURE — 97110 THERAPEUTIC EXERCISES: CPT | Performed by: PHYSICAL THERAPIST

## 2022-08-09 NOTE — PROGRESS NOTES
"Physical Therapy Daily Progress Note      Patient: Keya Rogers   : 1940  Diagnosis/ICD-10 Code:  Lumbosacral radiculopathy [M54.17]   Problems Addressed this Visit    None     Visit Diagnoses     Lumbosacral radiculopathy    -  Primary    Lateral pain of right hip        History of total hip arthroplasty, left        Gait instability        Generalized weakness          Diagnoses       Codes Comments    Lumbosacral radiculopathy    -  Primary ICD-10-CM: M54.17  ICD-9-CM: 724.4     Lateral pain of right hip     ICD-10-CM: M25.551  ICD-9-CM: 719.45     History of total hip arthroplasty, left     ICD-10-CM: Z96.642  ICD-9-CM: V43.64     Gait instability     ICD-10-CM: R26.81  ICD-9-CM: 781.2     Generalized weakness     ICD-10-CM: R53.1  ICD-9-CM: 780.79         Referring practitioner: Amie Sharma*  Date of Initial Visit: Type: THERAPY  Noted: 2022  Today's Date: 2022    VISIT#: 4    Subjective   Brie reports no significant change in back sxs. She's been spending a lot of time going through boxes in the garage and remarks she \"overdid' it this weekend.     Objective     See Exercise, Manual, and Modality Logs for complete treatment.     Assessment/Plan  Brie had a difficult time with ZACHARY chavez, remarking, that's her bad hip. She was able to complete sled leg press today and inc'd resistance on NuStep bike.   Progress strengthening /stabilization /functional activity         Timed:         Manual Therapy:         mins  25118;     Therapeutic Exercise:    38     mins  31031;     Neuromuscular Estelle:        mins  35962;    Therapeutic Activity:     2     mins  94833;     Gait Training:           mins  31265;     Ultrasound:          mins  14240;    Ionto                                   mins   94447  Self Care                            mins   21153  Canalith Repos                   mins  43776  Tests & Measures              mins   64329      Un-Timed:  Electrical Stimulation:     "     mins  79941 ( );  Dry Needling          mins 97703/06229  Traction          mins 50592  Low Eval          Mins  61796  Mod Eval          Mins  39813  High Eval                            Mins  31829  Re-Eval                               mins  18191    Timed Treatment:   40   mins   Total Treatment:     40   mins    Balbina Alves, PT, DPT, cert. DN  Physical Therapist  [place] Lic # [id number]

## 2022-08-17 ENCOUNTER — TREATMENT (OUTPATIENT)
Dept: PHYSICAL THERAPY | Facility: CLINIC | Age: 82
End: 2022-08-17

## 2022-08-17 DIAGNOSIS — Z96.642 HISTORY OF TOTAL HIP ARTHROPLASTY, LEFT: ICD-10-CM

## 2022-08-17 DIAGNOSIS — M25.551 LATERAL PAIN OF RIGHT HIP: ICD-10-CM

## 2022-08-17 DIAGNOSIS — M54.17 LUMBOSACRAL RADICULOPATHY: Primary | ICD-10-CM

## 2022-08-17 DIAGNOSIS — R53.1 GENERALIZED WEAKNESS: ICD-10-CM

## 2022-08-17 DIAGNOSIS — R26.81 GAIT INSTABILITY: ICD-10-CM

## 2022-08-17 PROCEDURE — 97112 NEUROMUSCULAR REEDUCATION: CPT | Performed by: PHYSICAL THERAPIST

## 2022-08-17 PROCEDURE — 97110 THERAPEUTIC EXERCISES: CPT | Performed by: PHYSICAL THERAPIST

## 2022-08-17 NOTE — PROGRESS NOTES
Physical Therapy Daily Progress Note      Patient: Keya Rogers   : 1940  Diagnosis/ICD-10 Code:  Lumbosacral radiculopathy [M54.17]   Problems Addressed this Visit    None     Visit Diagnoses     Lumbosacral radiculopathy    -  Primary    Lateral pain of right hip        History of total hip arthroplasty, left        Gait instability        Generalized weakness          Diagnoses       Codes Comments    Lumbosacral radiculopathy    -  Primary ICD-10-CM: M54.17  ICD-9-CM: 724.4     Lateral pain of right hip     ICD-10-CM: M25.551  ICD-9-CM: 719.45     History of total hip arthroplasty, left     ICD-10-CM: Z96.642  ICD-9-CM: V43.64     Gait instability     ICD-10-CM: R26.81  ICD-9-CM: 781.2     Generalized weakness     ICD-10-CM: R53.1  ICD-9-CM: 780.79         Referring practitioner: Amie Sharma*  Date of Initial Visit: Type: THERAPY  Noted: 2022  Today's Date: 2022    VISIT#: 5    Subjective   Brie reports she wasn't too sore after last tx session.     Objective     See Exercise, Manual, and Modality Logs for complete treatment.     Assessment/Plan  Brie had a difficult time with eyes closed balance, though did not have LOB and only required intermittent light fingertip touch. Intro'd upright postural exercise today, seated no money.   Progress strengthening /stabilization /functional activity         Timed:         Manual Therapy:         mins  60147;     Therapeutic Exercise:    27     mins  03566;     Neuromuscular Estelle:    12    mins  00107;    Therapeutic Activity:          mins  35942;     Gait Training:           mins  21681;     Ultrasound:          mins  52506;    Ionto                                   mins   35266  Self Care                            mins   11179  Canalith Repos                   mins  51599  Tests & Measures              mins   59578      Un-Timed:  Electrical Stimulation:         mins  15730 ( );  Dry Needling          mins  20561/20560  Traction          mins 55399  Low Eval          Mins  41787  Mod Eval          Mins  33259  High Eval                            Mins  11840  Re-Eval                               mins  22136    Timed Treatment:   39   mins   Total Treatment:     39   mins    Balbina Alves, PT, DPT, cert. DN  Physical Therapist  [place] Lic # [id number]

## 2022-08-23 ENCOUNTER — TREATMENT (OUTPATIENT)
Dept: PHYSICAL THERAPY | Facility: CLINIC | Age: 82
End: 2022-08-23

## 2022-08-23 DIAGNOSIS — R26.81 GAIT INSTABILITY: ICD-10-CM

## 2022-08-23 DIAGNOSIS — M54.17 LUMBOSACRAL RADICULOPATHY: Primary | ICD-10-CM

## 2022-08-23 DIAGNOSIS — Z96.642 HISTORY OF TOTAL HIP ARTHROPLASTY, LEFT: ICD-10-CM

## 2022-08-23 DIAGNOSIS — M25.551 LATERAL PAIN OF RIGHT HIP: ICD-10-CM

## 2022-08-23 DIAGNOSIS — R53.1 GENERALIZED WEAKNESS: ICD-10-CM

## 2022-08-23 PROCEDURE — 97112 NEUROMUSCULAR REEDUCATION: CPT | Performed by: PHYSICAL THERAPIST

## 2022-08-23 PROCEDURE — 97110 THERAPEUTIC EXERCISES: CPT | Performed by: PHYSICAL THERAPIST

## 2022-08-23 NOTE — PROGRESS NOTES
Physical Therapy Daily Progress Note      Patient: Keya Rogers   : 1940  Diagnosis/ICD-10 Code:  Lumbosacral radiculopathy [M54.17]   Problems Addressed this Visit    None     Visit Diagnoses     Lumbosacral radiculopathy    -  Primary    Lateral pain of right hip        History of total hip arthroplasty, left        Gait instability        Generalized weakness          Diagnoses       Codes Comments    Lumbosacral radiculopathy    -  Primary ICD-10-CM: M54.17  ICD-9-CM: 724.4     Lateral pain of right hip     ICD-10-CM: M25.551  ICD-9-CM: 719.45     History of total hip arthroplasty, left     ICD-10-CM: Z96.642  ICD-9-CM: V43.64     Gait instability     ICD-10-CM: R26.81  ICD-9-CM: 781.2     Generalized weakness     ICD-10-CM: R53.1  ICD-9-CM: 780.79         Referring practitioner: Amie Sharma*  Date of Initial Visit: Type: THERAPY  Noted: 2022  Today's Date: 2022    VISIT#: 6    Subjective   Brie reports she thinks her balance has been getting better. Otherwise, he pain is about the same.     Objective     See Exercise, Manual, and Modality Logs for complete treatment.     Assessment/Plan  Progressed NMR to NBOS modified sharpened romberg. She required unilat UE support for eyes closed but had no over LOB. She was encouraged to schedule more appts to continue progressing if she finds it valuable. Only 1 appt left on schedule at this time.   Progress strengthening /stabilization /functional activity         Timed:         Manual Therapy:         mins  77854;     Therapeutic Exercise:    30     mins  07438;     Neuromuscular Estelle:    18    mins  65843;    Therapeutic Activity:          mins  22770;     Gait Training:           mins  20306;     Ultrasound:          mins  18186;    Ionto                                   mins   22195  Self Care                            mins   68719  Canalith Repos                   mins  86687  Tests & Measures              mins    48947      Un-Timed:  Electrical Stimulation:         mins  75963 ( );  Dry Needling          mins 86435/41768  Traction          mins 96677  Low Eval          Mins  26266  Mod Eval          Mins  86387  High Eval                            Mins  20948  Re-Eval                               mins  02788    Timed Treatment:   48   mins   Total Treatment:     48   mins    Balbina Alves, PT, DPT, cert. DN  Physical Therapist  [place] Lic # [id number]

## 2022-09-15 ENCOUNTER — TREATMENT (OUTPATIENT)
Dept: PHYSICAL THERAPY | Facility: CLINIC | Age: 82
End: 2022-09-15

## 2022-09-15 DIAGNOSIS — M54.17 LUMBOSACRAL RADICULOPATHY: Primary | ICD-10-CM

## 2022-09-15 DIAGNOSIS — M25.551 LATERAL PAIN OF RIGHT HIP: ICD-10-CM

## 2022-09-15 DIAGNOSIS — R26.81 GAIT INSTABILITY: ICD-10-CM

## 2022-09-15 DIAGNOSIS — Z96.642 HISTORY OF TOTAL HIP ARTHROPLASTY, LEFT: ICD-10-CM

## 2022-09-15 DIAGNOSIS — R53.1 GENERALIZED WEAKNESS: ICD-10-CM

## 2022-09-15 PROCEDURE — 97110 THERAPEUTIC EXERCISES: CPT | Performed by: PHYSICAL THERAPIST

## 2022-09-15 NOTE — PROGRESS NOTES
Physical Therapy Daily Progress Note      Patient: Keya Rogers   : 1940  Diagnosis/ICD-10 Code:  Lumbosacral radiculopathy [M54.17]   Problems Addressed this Visit    None     Visit Diagnoses     Lumbosacral radiculopathy    -  Primary    Lateral pain of right hip        History of total hip arthroplasty, left        Gait instability        Generalized weakness          Diagnoses       Codes Comments    Lumbosacral radiculopathy    -  Primary ICD-10-CM: M54.17  ICD-9-CM: 724.4     Lateral pain of right hip     ICD-10-CM: M25.551  ICD-9-CM: 719.45     History of total hip arthroplasty, left     ICD-10-CM: Z96.642  ICD-9-CM: V43.64     Gait instability     ICD-10-CM: R26.81  ICD-9-CM: 781.2     Generalized weakness     ICD-10-CM: R53.1  ICD-9-CM: 780.79         Referring practitioner: Amie Sharma*  Date of Initial Visit: Type: THERAPY  Noted: 2022  Today's Date: 9/15/2022    VISIT#: 7    Subjective   Brie reports she is planning on leaving to go back to [place] in the middle of next month. She decided to list her house and property here so she's been cleaning. She notes she almost canceled today's appt but know that PT is beneficial so she came and would like to schedule some more appts.     Objective     See Exercise, Manual, and Modality Logs for complete treatment.     Assessment/Plan  Brie cont to report intermittent back ache. She will be moving back to [place] next month sometime but would like to get in a couple more PT appts before she leaves.   Progress strengthening /stabilization /functional activity         Timed:         Manual Therapy:         mins  69469;     Therapeutic Exercise:    39     mins  15388;     Neuromuscular Estelle:        mins  48286;    Therapeutic Activity:          mins  40695;     Gait Training:           mins  28968;     Ultrasound:          mins  03025;    Ionto                                   mins   64054  Self Care                            mins    98233  Canalith Repos                   mins  01637  Tests & Measures              mins   17273      Un-Timed:  Electrical Stimulation:         mins  89043 ( );  Dry Needling          mins 67841/34442  Traction          mins 74667  Low Eval          Mins  80174  Mod Eval          Mins  84337  High Eval                            Mins  86458  Re-Eval                               mins  86522    Timed Treatment:   39   mins   Total Treatment:     39   mins    Balbina Alves, PT, DPT, cert. DN  Physical Therapist  [place] Lic # [id number]

## 2022-09-21 ENCOUNTER — TREATMENT (OUTPATIENT)
Dept: PHYSICAL THERAPY | Facility: CLINIC | Age: 82
End: 2022-09-21

## 2022-09-21 DIAGNOSIS — R26.81 GAIT INSTABILITY: ICD-10-CM

## 2022-09-21 DIAGNOSIS — M54.17 LUMBOSACRAL RADICULOPATHY: Primary | ICD-10-CM

## 2022-09-21 DIAGNOSIS — R53.1 GENERALIZED WEAKNESS: ICD-10-CM

## 2022-09-21 DIAGNOSIS — M25.551 LATERAL PAIN OF RIGHT HIP: ICD-10-CM

## 2022-09-21 DIAGNOSIS — Z96.642 HISTORY OF TOTAL HIP ARTHROPLASTY, LEFT: ICD-10-CM

## 2022-09-21 PROCEDURE — 97112 NEUROMUSCULAR REEDUCATION: CPT | Performed by: PHYSICAL THERAPIST

## 2022-09-21 PROCEDURE — 97110 THERAPEUTIC EXERCISES: CPT | Performed by: PHYSICAL THERAPIST

## 2022-09-21 NOTE — PROGRESS NOTES
Physical Therapy Daily Progress Note      Patient: Keya Rogers   : 1940  Diagnosis/ICD-10 Code:  Lumbosacral radiculopathy [M54.17]   Problems Addressed this Visit    None     Visit Diagnoses     Lumbosacral radiculopathy    -  Primary    Lateral pain of right hip        Gait instability        Generalized weakness        History of total hip arthroplasty, left          Diagnoses       Codes Comments    Lumbosacral radiculopathy    -  Primary ICD-10-CM: M54.17  ICD-9-CM: 724.4     Lateral pain of right hip     ICD-10-CM: M25.551  ICD-9-CM: 719.45     Gait instability     ICD-10-CM: R26.81  ICD-9-CM: 781.2     Generalized weakness     ICD-10-CM: R53.1  ICD-9-CM: 780.79     History of total hip arthroplasty, left     ICD-10-CM: Z96.642  ICD-9-CM: V43.64         Referring practitioner: Amie Sharma*  Date of Initial Visit: Type: THERAPY  Noted: 2022  Today's Date: 2022    VISIT#: 8    Subjective   Brie reports she is about the same.     Objective     See Exercise, Manual, and Modality Logs for complete treatment.     Assessment/Plan  Brie is still planning on moving to [place] in Oct. Will cont to promote general strength and stability until she leaves.   Progress strengthening /stabilization /functional activity         Timed:         Manual Therapy:         mins  28246;     Therapeutic Exercise:    25     mins  95033;     Neuromuscular Estelle:    15    mins  46802;    Therapeutic Activity:          mins  83993;     Gait Training:           mins  52136;     Ultrasound:          mins  51727;    Ionto                                   mins   58105  Self Care                            mins   72979  Canalith Repos                   mins  92366  Tests & Measures              mins   72798      Un-Timed:  Electrical Stimulation:         mins  95844 ( );  Dry Needling          mins /  Traction          mins 08370  Low Eval          Mins  00226  Mod Eval          Mins  03951  High  Eval                            Mins  53421  Re-Eval                               mins  88973    Timed Treatment:   40   mins   Total Treatment:     40   mins    Balbina Alves, PT, DPT, cert. DN  Physical Therapist  [place] Lic # [id number]

## 2022-09-22 ENCOUNTER — OFFICE VISIT (OUTPATIENT)
Dept: FAMILY MEDICINE CLINIC | Facility: CLINIC | Age: 82
End: 2022-09-22

## 2022-09-22 VITALS
RESPIRATION RATE: 17 BRPM | HEIGHT: 61 IN | TEMPERATURE: 97.3 F | DIASTOLIC BLOOD PRESSURE: 60 MMHG | OXYGEN SATURATION: 98 % | SYSTOLIC BLOOD PRESSURE: 120 MMHG | HEART RATE: 83 BPM | WEIGHT: 151 LBS | BODY MASS INDEX: 28.51 KG/M2

## 2022-09-22 DIAGNOSIS — E66.3 OVERWEIGHT (BMI 25.0-29.9): ICD-10-CM

## 2022-09-22 DIAGNOSIS — Z23 NEED FOR INFLUENZA VACCINATION: ICD-10-CM

## 2022-09-22 DIAGNOSIS — F41.9 ANXIETY: ICD-10-CM

## 2022-09-22 DIAGNOSIS — Z76.89 ENCOUNTER TO ESTABLISH CARE: Primary | ICD-10-CM

## 2022-09-22 DIAGNOSIS — I10 PRIMARY HYPERTENSION: ICD-10-CM

## 2022-09-22 PROCEDURE — 99214 OFFICE O/P EST MOD 30 MIN: CPT | Performed by: STUDENT IN AN ORGANIZED HEALTH CARE EDUCATION/TRAINING PROGRAM

## 2022-09-22 PROCEDURE — G0008 ADMIN INFLUENZA VIRUS VAC: HCPCS | Performed by: STUDENT IN AN ORGANIZED HEALTH CARE EDUCATION/TRAINING PROGRAM

## 2022-09-22 PROCEDURE — 90662 IIV NO PRSV INCREASED AG IM: CPT | Performed by: STUDENT IN AN ORGANIZED HEALTH CARE EDUCATION/TRAINING PROGRAM

## 2022-09-22 RX ORDER — ALPRAZOLAM 1 MG/1
0.5 TABLET ORAL DAILY PRN
Qty: 30 TABLET | Refills: 0 | Status: SHIPPED | OUTPATIENT
Start: 2022-09-22

## 2022-09-22 NOTE — PROGRESS NOTES
Subjective   Keya Rogers is a 81 y.o. female.     Chief Complaint   Patient presents with   • Establish Care   • Anxiety   • Hypertension       History of Present Illness  establish care :   Patient is here today and she states that she is wanting to get established with a new PCP as she was a former patient of Dr. Escobar.   Anxiety  Presents for follow-up visit. Symptoms include decreased concentration, depressed mood, excessive worry, irritability, nervous/anxious behavior, obsessions and restlessness. Patient reports no chest pain, compulsions, confusion, dizziness, dry mouth, feeling of choking, hyperventilation, impotence, insomnia, malaise, muscle tension, nausea, palpitations, panic, shortness of breath or suicidal ideas. Symptoms occur occasionally. The severity of symptoms is interfering with daily activities and mild. The quality of sleep is fair. Nighttime awakenings: occasional.     Compliance with medications is % (Xanax 1/2 tablet weekly but she can take a whole if needed). Treatment side effects: none    Hypertension  This is a chronic problem. The current episode started more than 1 year ago. The problem has been gradually improving since onset. The problem is controlled. Associated symptoms include anxiety and malaise/fatigue. Pertinent negatives include no blurred vision, chest pain, headaches, neck pain, orthopnea, palpitations, peripheral edema, PND, shortness of breath or sweats. Risk factors for coronary artery disease include family history and stress. Past treatments include ACE inhibitors and diuretics. Current antihypertension treatment includes ACE inhibitors and diuretics. The current treatment provides mild improvement. There are no compliance problems.  There is no history of angina, kidney disease, CAD/MI, CVA, heart failure, left ventricular hypertrophy, PVD or retinopathy. There is no history of chronic renal disease, coarctation of the aorta, hyperaldosteronism,  hypercortisolism, hyperparathyroidism, a hypertension causing med, pheochromocytoma, renovascular disease, sleep apnea or a thyroid problem.        The following portions of the patient's history were reviewed and updated as appropriate: allergies, current medications, past family history, past medical history, past social history, past surgical history and problem list.    Allergies:  Allergies   Allergen Reactions   • Dimenhydrinate Other (See Comments)   • Oxycodone-Acetaminophen Nausea And Vomiting   • Oxycodone Hcl GI Intolerance   • Scopolamine Confusion       Social History:  Social History     Socioeconomic History   • Marital status:    Tobacco Use   • Smoking status: Never Smoker   • Smokeless tobacco: Never Used   Substance and Sexual Activity   • Alcohol use: Never   • Sexual activity: Defer       Family History:  Family History   Problem Relation Age of Onset   • Hypertension Mother    • Cancer Mother    • Uterine cancer Mother    • COPD Father    • Heart disease Father        Past Medical History :  Patient Active Problem List   Diagnosis   • Hypertension   • Osteoarthritis   • Anxiety   • Bilateral low back pain with right-sided sciatica   • Chronic kidney disease, stage 3 (HCC)   • Degeneration of intervertebral disc of lumbar region   • Hearing loss of left ear   • History of chicken pox   • Hot flash, menopausal   • Hyperglycemia   • Livedo reticularis   • Lower extremity pain, posterior   • Osteoarthritis of both knees   • Prolonged QT interval   • Rash   • Rosacea   • Syncope   • Essential hypertension   • Skin lesion of back   • History of DVT (deep vein thrombosis)   • Arthritis of both hands   • Osteopenia of multiple sites   • Overweight (BMI 25.0-29.9)       Medication List:  Outpatient Encounter Medications as of 9/22/2022   Medication Sig Dispense Refill   • Acetaminophen (TYLENOL ARTHRITIS EXT RELIEF PO) Take 650 mg by mouth Every 12 (Twelve) Hours.     • ALPRAZolam (XANAX) 1 MG  "tablet Take 0.5 tablets by mouth Daily As Needed for Anxiety. Use sparingly. 30 tablet 0   • Calcium Carbonate (CALCIUM 600 PO) Take  by mouth Daily.     • hydroCHLOROthiazide (HYDRODIURIL) 25 MG tablet Take 1 tablet by mouth Daily. 90 tablet 2   • lisinopril (PRINIVIL,ZESTRIL) 5 MG tablet Take 1 tablet by mouth Daily. 90 tablet 2   • metroNIDAZOLE (METROGEL) 0.75 % gel Apply  topically to the appropriate area as directed Daily. 45 g 5   • traMADol (ULTRAM) 50 MG tablet Take 1 tablet by mouth Every 8 (Eight) Hours As Needed for Severe Pain . 15 tablet 0   • [DISCONTINUED] ALPRAZolam (XANAX) 1 MG tablet Take 0.5 tablets by mouth Daily As Needed for Anxiety. Use sparingly. 30 tablet 0   • [DISCONTINUED] escitalopram (LEXAPRO) 5 MG tablet        No facility-administered encounter medications on file as of 9/22/2022.       Past Surgical History:  Past Surgical History:   Procedure Laterality Date   • BUNIONECTOMY      RIght foot    • COLONOSCOPY         Review of Systems:  Review of Systems   Constitutional: Positive for irritability and malaise/fatigue.   Eyes: Negative for blurred vision.   Respiratory: Negative for shortness of breath.    Cardiovascular: Negative for chest pain, palpitations, orthopnea and PND.   Gastrointestinal: Negative for nausea.   Genitourinary: Negative for impotence.   Musculoskeletal: Negative for neck pain.   Neurological: Negative for dizziness and confusion.   Psychiatric/Behavioral: Positive for decreased concentration and depressed mood. Negative for suicidal ideas. The patient is nervous/anxious. The patient does not have insomnia.        I have reviewed and confirmed the accuracy of the HPI and ROS as documented by the MA/LPN/RN Lady Kay MD    Vital Signs:  Visit Vitals  /60   Pulse 83   Temp 97.3 °F (36.3 °C)   Resp 17   Ht 154.9 cm (61\")   Wt 68.5 kg (151 lb)   SpO2 98%   BMI 28.53 kg/m²       Physical Exam  Vitals reviewed.   Constitutional:       Appearance: Normal " appearance.   HENT:      Head: Normocephalic and atraumatic.      Mouth/Throat:      Mouth: Mucous membranes are moist.      Pharynx: Oropharynx is clear.   Eyes:      General: No scleral icterus.     Extraocular Movements: Extraocular movements intact.      Conjunctiva/sclera: Conjunctivae normal.      Pupils: Pupils are equal, round, and reactive to light.   Cardiovascular:      Rate and Rhythm: Normal rate and regular rhythm.      Heart sounds:     No friction rub. No gallop.   Pulmonary:      Effort: Pulmonary effort is normal. No respiratory distress.      Breath sounds: Normal breath sounds. No wheezing.   Abdominal:      General: Bowel sounds are normal. There is no distension.      Palpations: Abdomen is soft.      Tenderness: There is no abdominal tenderness.   Musculoskeletal:         General: No swelling, tenderness or deformity. Normal range of motion.      Cervical back: Normal range of motion. No rigidity or tenderness.   Lymphadenopathy:      Cervical: No cervical adenopathy.   Skin:     General: Skin is warm.      Capillary Refill: Capillary refill takes less than 2 seconds.      Findings: No lesion or rash.   Neurological:      General: No focal deficit present.      Mental Status: She is alert and oriented to person, place, and time. Mental status is at baseline.      Gait: Gait normal.   Psychiatric:         Behavior: Behavior normal.         Thought Content: Thought content normal.         Assessment and Plan:  Problem List Items Addressed This Visit        Cardiac and Vasculature    Hypertension       Endocrine and Metabolic    Overweight (BMI 25.0-29.9)    Current Assessment & Plan     Patient's (Body mass index is 28.53 kg/m².) indicates that they are overweight with health conditions that include hypertension . Weight is improving with lifestyle modifications. BMI is is above average; BMI management plan is completed. We discussed portion control and increasing exercise.             Mental  Health    Anxiety    Overview     Infrequent usage of medication But with increasing frequency.  She declines change in medication to an SSRI.  Risk of drug dependency has been discussed.  Call when refills needed.  with panic attacks         Relevant Medications    ALPRAZolam (XANAX) 1 MG tablet      Other Visit Diagnoses     Encounter to establish care    -  Primary    Need for influenza vaccination        Relevant Orders    Fluzone High-Dose 65+yrs (Completed)          An After Visit Summary and PPPS were given to the patient.   Patient presents today to follow-up about chronic medical conditions.  She will be wintering in [place] with her family until the spring.  Requesting a refill on her Xanax today.  Which she takes as needed for anxiety.  States she believes her other medications are on auto refill at this time.  Administered flu vaccine today.  Patient has no other Acute complaints.  Recommend follow-up with me in 6 months or sooner if needed.    I wore protective equipment throughout this patient encounter to include mask and eye protection. Hand hygiene was performed before donning protective equipment and after removal when leaving the room.

## 2022-09-22 NOTE — ASSESSMENT & PLAN NOTE
Patient's (Body mass index is 28.53 kg/m².) indicates that they are overweight with health conditions that include hypertension . Weight is improving with lifestyle modifications. BMI is is above average; BMI management plan is completed. We discussed portion control and increasing exercise.

## 2022-09-29 ENCOUNTER — PATIENT ROUNDING (BHMG ONLY) (OUTPATIENT)
Dept: FAMILY MEDICINE CLINIC | Facility: CLINIC | Age: 82
End: 2022-09-29

## 2022-09-29 ENCOUNTER — TELEPHONE (OUTPATIENT)
Dept: PHYSICAL THERAPY | Facility: CLINIC | Age: 82
End: 2022-09-29

## 2022-09-29 NOTE — PROGRESS NOTES
September 29, 2022    Hello, may I speak with Keya Rogers?    My name is Monik      I am  with [facility]  [address] [place] [place] [address].    Before we get started may I verify your date of birth? 1940    I am calling to officially welcome you to our practice and ask about your recent visit. Is this a good time to talk? yes    Tell me about your visit with us. What things went well?  went very well       We're always looking for ways to make our patients' experiences even better. Do you have recommendations on ways we may improve?  no    Overall were you satisfied with your first visit to our practice? yes       I appreciate you taking the time to speak with me today. Is there anything else I can do for you? no      Thank you, and have a great day.

## 2022-10-05 ENCOUNTER — TREATMENT (OUTPATIENT)
Dept: PHYSICAL THERAPY | Facility: CLINIC | Age: 82
End: 2022-10-05

## 2022-10-05 DIAGNOSIS — M54.17 LUMBOSACRAL RADICULOPATHY: Primary | ICD-10-CM

## 2022-10-05 DIAGNOSIS — R53.1 GENERALIZED WEAKNESS: ICD-10-CM

## 2022-10-05 DIAGNOSIS — Z96.642 HISTORY OF TOTAL HIP ARTHROPLASTY, LEFT: ICD-10-CM

## 2022-10-05 DIAGNOSIS — R26.81 GAIT INSTABILITY: ICD-10-CM

## 2022-10-05 DIAGNOSIS — M25.551 LATERAL PAIN OF RIGHT HIP: ICD-10-CM

## 2022-10-05 PROCEDURE — 97750 PHYSICAL PERFORMANCE TEST: CPT | Performed by: PHYSICAL THERAPIST

## 2022-10-05 PROCEDURE — 97110 THERAPEUTIC EXERCISES: CPT | Performed by: PHYSICAL THERAPIST

## 2022-10-05 NOTE — PROGRESS NOTES
Physical Therapy Progress Note/ DISCHARGE    Patient: Keya Rogers   : 1940  Diagnosis/ICD-10 Code:  Lumbosacral radiculopathy [M54.17]  Referring practitioner: Amie Sharma*  Date of Initial Evaluation:  Type: THERAPY  Noted: 2022  Patient seen for 9 sessions      Subjective:   Visit Diagnoses:    ICD-10-CM ICD-9-CM   1. Lumbosacral radiculopathy  M54.17 724.4   2. Lateral pain of right hip  M25.551 719.45   3. Gait instability  R26.81 781.2   4. Generalized weakness  R53.1 780.79   5. History of total hip arthroplasty, left  Z96.642 V43.64         Keya Rogers reports she has noticed an improvement since beginning therapy. She is now able to lift heavy items which she couldn't before. She notes that 2x/wk PT is helpful but since our clinic schedule has been so busy she hasn't been able to come as often and that seems to have a negative effect on back pain. She is leaving to go to [place] in mid Oct.   Subjective Questionnaire: Oswestry: 38% impairment  Clinical Progress: improved  Home Program Compliance: Yes  Treatment has included: therapeutic exercise and neuromuscular re-education      Subjective       Objective     Plan Goals: STG to be met in 4 wks:   1. Pt will report increased tolerance to walking at least 20 in 4wks to get back to PLOF. - MET  2. Pt will report no greater than 5/10 average lbp in 4 wks for improved QOL. - MET 4/10; 0/10 in bed  3. Pt will demonstrate at least 15 deg L hip ER for normalized gait pattern. - MET 15 deg  4. Pt will report initiation of centralization (radicular sxs going no farther than mid calf (initially to ankle). - MET, absence of radicular sxs  5. Pt will be safe, independent, and compliant with HEP to optimize recovery and self management of symptoms. - MET    LTG to be met in 12 wks:   1. Pt will report inc'd tolerance to walking at least 30 min for general fitness by DC - 20 min then would need to stop and take a break  2. Pt will report no  greater than 4/10 intermittent lbp for improved QOL. - Progressing, 4/10 average, inc'd with lifting/carrying  4. Pt will demonstrate at least 30 deg L hip ER for normalized gait mechanics. - NOT MET, 15 deg   5. Pt will report centralization of sxs to reflect improved lumbar mechanics and reduced risk of nerve injury. - MET  6. Pt will report no greater than 30% on FILOMENA (initially 42%) to reflect improved functional mobility & tolerance. - NOT MET, 38% on 10/5/22  7. Pt will demonstrate at least 10 STS in 30s w/o UE support to reflect age norm and improved dynamic stability/le strength. - MET 10 on 10/5    Assessment/Plan  Brie has made progress in PT despite inconsistent scheduling. See goals list     Recommendations: Continue with recommendations DC next visit bc [place]  Timeframe: 2 weeks  Prognosis to achieve goals: good      Timed:         Manual Therapy:         mins  24302;     Therapeutic Exercise:    25     mins  74549;     Neuromuscular Estelle:        mins  86787;    Therapeutic Activity:          mins  12134;     Gait Training:           mins  78115;     Ultrasound:          mins  45791;    Ionto                                   mins   15512  Self Care                            mins   03228  Canalith Repos         mins 53471  Tests & Measures         15      mins 49292     Un-Timed:  Electrical Stimulation:         mins  97491 ( );  Dry Needling          mins self-pay  Traction          mins 81509      Timed Treatment:  40    mins   Total Treatment:     40   mins    PT Signature: Balbina Alves, PT  [place] License: [id number]

## 2022-10-13 ENCOUNTER — TELEPHONE (OUTPATIENT)
Dept: PHYSICAL THERAPY | Facility: CLINIC | Age: 82
End: 2022-10-13